# Patient Record
Sex: FEMALE | Race: WHITE | Employment: UNEMPLOYED | URBAN - METROPOLITAN AREA
[De-identification: names, ages, dates, MRNs, and addresses within clinical notes are randomized per-mention and may not be internally consistent; named-entity substitution may affect disease eponyms.]

---

## 2020-01-31 ENCOUNTER — HOSPITAL ENCOUNTER (EMERGENCY)
Age: 20
Discharge: HOME | End: 2020-01-31
Attending: EMERGENCY MEDICINE
Payer: COMMERCIAL

## 2020-01-31 VITALS
TEMPERATURE: 98.6 F | HEART RATE: 93 BPM | RESPIRATION RATE: 18 BRPM | SYSTOLIC BLOOD PRESSURE: 121 MMHG | DIASTOLIC BLOOD PRESSURE: 90 MMHG | OXYGEN SATURATION: 99 %

## 2020-01-31 LAB
RAPID INFLUENZA  B AGN: NEGATIVE
RAPID INFLUENZA A AGN: NEGATIVE
S PYO AG THROAT QL: NEGATIVE

## 2020-01-31 PROCEDURE — 87081 CULTURE SCREEN ONLY: CPT

## 2020-01-31 PROCEDURE — 99283 EMERGENCY DEPT VISIT LOW MDM: CPT

## 2020-01-31 PROCEDURE — 6360000002 HC RX W HCPCS: Performed by: EMERGENCY MEDICINE

## 2020-01-31 PROCEDURE — 6370000000 HC RX 637 (ALT 250 FOR IP): Performed by: EMERGENCY MEDICINE

## 2020-01-31 PROCEDURE — 87880 STREP A ASSAY W/OPTIC: CPT

## 2020-01-31 PROCEDURE — 87804 INFLUENZA ASSAY W/OPTIC: CPT

## 2020-01-31 RX ORDER — CEFDINIR 300 MG/1
300 CAPSULE ORAL 2 TIMES DAILY
COMMUNITY
End: 2021-09-21

## 2020-01-31 RX ORDER — DEXAMETHASONE SODIUM PHOSPHATE 10 MG/ML
10 INJECTION INTRAMUSCULAR; INTRAVENOUS ONCE
Status: COMPLETED | OUTPATIENT
Start: 2020-01-31 | End: 2020-01-31

## 2020-01-31 RX ORDER — BENZONATATE 100 MG/1
200 CAPSULE ORAL 3 TIMES DAILY PRN
Qty: 30 CAPSULE | Refills: 0 | Status: SHIPPED | OUTPATIENT
Start: 2020-01-31 | End: 2020-02-07

## 2020-01-31 RX ORDER — FLUTICASONE PROPIONATE 50 MCG
2 SPRAY, SUSPENSION (ML) NASAL DAILY
Qty: 1 BOTTLE | Refills: 0 | Status: SHIPPED | OUTPATIENT
Start: 2020-01-31 | End: 2021-09-21

## 2020-01-31 RX ORDER — LIDOCAINE HYDROCHLORIDE 20 MG/ML
15 SOLUTION OROPHARYNGEAL ONCE
Status: COMPLETED | OUTPATIENT
Start: 2020-01-31 | End: 2020-01-31

## 2020-01-31 RX ORDER — ONDANSETRON 4 MG/1
4 TABLET, ORALLY DISINTEGRATING ORAL EVERY 8 HOURS PRN
Qty: 12 TABLET | Refills: 0 | Status: SHIPPED | OUTPATIENT
Start: 2020-01-31 | End: 2021-09-21

## 2020-01-31 RX ORDER — IBUPROFEN 600 MG/1
600 TABLET ORAL ONCE
Status: COMPLETED | OUTPATIENT
Start: 2020-01-31 | End: 2020-01-31

## 2020-01-31 RX ORDER — IBUPROFEN 600 MG/1
600 TABLET ORAL EVERY 8 HOURS PRN
Qty: 21 TABLET | Refills: 0 | Status: SHIPPED | OUTPATIENT
Start: 2020-01-31 | End: 2021-09-24

## 2020-01-31 RX ADMIN — DEXAMETHASONE SODIUM PHOSPHATE 10 MG: 10 INJECTION, SOLUTION INTRAMUSCULAR; INTRAVENOUS at 01:51

## 2020-01-31 RX ADMIN — IBUPROFEN 600 MG: 600 TABLET, FILM COATED ORAL at 01:51

## 2020-01-31 RX ADMIN — LIDOCAINE HYDROCHLORIDE 15 ML: 20 SOLUTION ORAL; TOPICAL at 01:51

## 2020-01-31 ASSESSMENT — PAIN SCALES - GENERAL: PAINLEVEL_OUTOF10: 10

## 2020-01-31 NOTE — ED PROVIDER NOTES
CHIEF COMPLAINT  Fever      HISTORY OF PRESENT ILLNESS  Sarah Bradford is a 23 y.o. female presents to the ED with sore throat, hurts to swallow, but not difficulty swallowing, still taking in fluids, has had fever as well, has been taking  Tylenol but nothing recently, started about 3 days ago, saw primary care doctor and did not do a strep test but started cefdinir, no chest pain or shortness of breath, a little nausea earlier but this resolved, no vomiting, no abdominal pain, no diarrhea, no urinary symptoms, no current concern for pregnancy. Been trying Chloraseptic, and it was helping initially but not now, no vaginal discharge or concern for STDs, no headache or neck stiffness, but nasal congestion that makes her ears hurt, she is a smoker, no other complaints, modifying factors or associated symptoms. I have reviewed the following from the nursing documentation. History reviewed. No pertinent past medical history. History reviewed. No pertinent surgical history. History reviewed. No pertinent family history.   Social History     Socioeconomic History    Marital status: Single     Spouse name: Not on file    Number of children: Not on file    Years of education: Not on file    Highest education level: Not on file   Occupational History    Not on file   Social Needs    Financial resource strain: Not on file    Food insecurity:     Worry: Not on file     Inability: Not on file    Transportation needs:     Medical: Not on file     Non-medical: Not on file   Tobacco Use    Smoking status: Current Every Day Smoker     Packs/day: 0.50     Types: Cigarettes   Substance and Sexual Activity    Alcohol use: No    Drug use: No    Sexual activity: Not Currently   Lifestyle    Physical activity:     Days per week: Not on file     Minutes per session: Not on file    Stress: Not on file   Relationships    Social connections:     Talks on phone: Not on file     Gets together: Not on file     Attends meningitis/encephalitis/sepsis/pneumonia, she appeared nontoxic and well-hydrated, no current concern for airway compromise, strict return precautions given, all questions answered, will return if any worsening symptoms or new concerns, see AVS for further discharge information, patient verbalized understanding of plan, felt comfortable going home. Orders Placed This Encounter   Procedures    Rapid influenza A/B antigens    Strep screen group a throat    Culture Beta Strep Confirm Plate     Orders Placed This Encounter   Medications    lidocaine viscous hcl (XYLOCAINE) 2 % solution 15 mL    ibuprofen (ADVIL;MOTRIN) tablet 600 mg    dexamethasone (DECADRON) injection 10 mg    benzonatate (TESSALON PERLES) 100 MG capsule     Sig: Take 2 capsules by mouth 3 times daily as needed for Cough     Dispense:  30 capsule     Refill:  0    fluticasone (FLONASE) 50 MCG/ACT nasal spray     Si sprays by Each Nostril route daily for 7 days     Dispense:  1 Bottle     Refill:  0    ibuprofen (ADVIL;MOTRIN) 600 MG tablet     Sig: Take 1 tablet by mouth every 8 hours as needed for Pain or Fever     Dispense:  21 tablet     Refill:  0    ondansetron (ZOFRAN ODT) 4 MG disintegrating tablet     Sig: Take 1 tablet by mouth every 8 hours as needed for Nausea or Vomiting     Dispense:  12 tablet     Refill:  0    Magic Mouthwash (MIRACLE MOUTHWASH)     Sig: Swish and spit 10 mLs 4 times daily as needed for Irritation (throat pain) 1 part 2% viscous lidocaine, 1 part diphenhydramine, 1 part Maalox     Dispense:  240 mL     Refill:  [de-identified]     23year-old with gross  Patient was given scripts for the following medications. I counseled patient how to take these medications.    New Prescriptions    BENZONATATE (TESSALON PERLES) 100 MG CAPSULE    Take 2 capsules by mouth 3 times daily as needed for Cough    FLUTICASONE (FLONASE) 50 MCG/ACT NASAL SPRAY    2 sprays by Each Nostril route daily for 7 days    IBUPROFEN (ADVIL;MOTRIN)

## 2020-01-31 NOTE — ED TRIAGE NOTES
Pt reports sore throat 3 days ago. Saw PCP put on cefdinir for clinical strep with -rapid. Doesn't seem to be helping.

## 2020-02-01 LAB — S PYO THROAT QL CULT: NORMAL

## 2021-09-21 ENCOUNTER — HOSPITAL ENCOUNTER (EMERGENCY)
Age: 21
Discharge: HOME | End: 2021-09-21
Attending: STUDENT IN AN ORGANIZED HEALTH CARE EDUCATION/TRAINING PROGRAM
Payer: COMMERCIAL

## 2021-09-21 VITALS
SYSTOLIC BLOOD PRESSURE: 152 MMHG | WEIGHT: 130 LBS | HEIGHT: 63 IN | BODY MASS INDEX: 23.04 KG/M2 | TEMPERATURE: 98 F | OXYGEN SATURATION: 100 % | HEART RATE: 85 BPM | DIASTOLIC BLOOD PRESSURE: 89 MMHG | RESPIRATION RATE: 20 BRPM

## 2021-09-21 DIAGNOSIS — F12.90 CANNABINOID HYPEREMESIS SYNDROME: Primary | ICD-10-CM

## 2021-09-21 DIAGNOSIS — R11.2 CANNABINOID HYPEREMESIS SYNDROME: Primary | ICD-10-CM

## 2021-09-21 PROCEDURE — 96374 THER/PROPH/DIAG INJ IV PUSH: CPT

## 2021-09-21 PROCEDURE — 6360000002 HC RX W HCPCS: Performed by: STUDENT IN AN ORGANIZED HEALTH CARE EDUCATION/TRAINING PROGRAM

## 2021-09-21 PROCEDURE — 99283 EMERGENCY DEPT VISIT LOW MDM: CPT

## 2021-09-21 RX ORDER — 0.9 % SODIUM CHLORIDE 0.9 %
1000 INTRAVENOUS SOLUTION INTRAVENOUS ONCE
Status: DISCONTINUED | OUTPATIENT
Start: 2021-09-21 | End: 2021-09-21

## 2021-09-21 RX ORDER — ONDANSETRON 4 MG/1
4 TABLET, ORALLY DISINTEGRATING ORAL EVERY 12 HOURS PRN
Qty: 20 TABLET | Refills: 0 | Status: ON HOLD | OUTPATIENT
Start: 2021-09-21 | End: 2022-06-09

## 2021-09-21 RX ORDER — ONDANSETRON 2 MG/ML
4 INJECTION INTRAMUSCULAR; INTRAVENOUS EVERY 6 HOURS PRN
Status: DISCONTINUED | OUTPATIENT
Start: 2021-09-21 | End: 2021-09-21 | Stop reason: HOSPADM

## 2021-09-21 RX ORDER — DICYCLOMINE HYDROCHLORIDE 10 MG/ML
20 INJECTION INTRAMUSCULAR ONCE
Status: DISCONTINUED | OUTPATIENT
Start: 2021-09-21 | End: 2021-09-21

## 2021-09-21 RX ADMIN — ONDANSETRON HYDROCHLORIDE 4 MG: 2 INJECTION, SOLUTION INTRAMUSCULAR; INTRAVENOUS at 16:52

## 2021-09-21 ASSESSMENT — ENCOUNTER SYMPTOMS
RHINORRHEA: 0
SHORTNESS OF BREATH: 0
SORE THROAT: 0
PHOTOPHOBIA: 0
ABDOMINAL PAIN: 1
COUGH: 0
NAUSEA: 1
BACK PAIN: 0
VOMITING: 1

## 2021-09-21 ASSESSMENT — PAIN DESCRIPTION - PAIN TYPE: TYPE: ACUTE PAIN

## 2021-09-21 ASSESSMENT — PAIN DESCRIPTION - LOCATION: LOCATION: ABDOMEN

## 2021-09-21 ASSESSMENT — PAIN SCALES - GENERAL: PAINLEVEL_OUTOF10: 5

## 2021-09-21 NOTE — ED PROVIDER NOTES
[address] ED  EMERGENCY DEPARTMENT ENCOUNTER      Pt Name: Jessica Ennis  MRN: [id number]  [place] 2000  Date of evaluation: 9/21/2021  Provider: Ivania Young, [address] [place] [address]       Chief Complaint   Patient presents with    Abdominal Pain     Pt states nausea and vomiting \"off and on\" for one month. Mom states that she was diagnosed with IBS and acid reflux and has an appoitment with GI on Oct. 14.    Emesis         HISTORY OF PRESENT ILLNESS   (Location/Symptom, Timing/Onset, Context/Setting, Quality, Duration, Modifying Factors, Severity)  Note limiting factors. Jessica Ennis is a 21 y.o. female who presents to the emergency department complaining of patient presents with a roughly 1 month history of intermittent severe nausea vomiting, generalized abdominal pain. Reports prior history of being diagnosed with irritable bowel syndrome however has not yet been seen by GI. Arrives actively vomiting. Has been seen previously at outside emergency department had lab work and was later discharged home without specific cause of symptoms. Patient states that the symptoms began short while after beginning to use marijuana daily. Reports that her symptoms are improved with hot showers or baths. Nursing Notes were reviewed. PAST MEDICAL HISTORY     Past Medical History:   Diagnosis Date    Kidney stone          SURGICAL HISTORY     History reviewed. No pertinent surgical history. CURRENT MEDICATIONS       Previous Medications    IBUPROFEN (ADVIL;MOTRIN) 600 MG TABLET    Take 1 tablet by mouth every 8 hours as needed for Pain or Fever       ALLERGIES     Zoloft [sertraline]    FAMILY HISTORY     History reviewed. No pertinent family history.        SOCIAL HISTORY       Social History     Socioeconomic History    Marital status: Single     Spouse name: None    Number of children: None    Years of education: None    Highest education level: None   Occupational History    None   Tobacco Use    Smoking status: Current Every Day Smoker     Packs/day: 1.00     Types: Cigarettes   Substance and Sexual Activity    Alcohol use: No    Drug use: No    Sexual activity: Not Currently   Other Topics Concern    None   Social History Narrative    None     Social Determinants of Health     Financial Resource Strain:     Difficulty of Paying Living Expenses:    Food Insecurity:     Worried About Running Out of Food in the Last Year:     Ran Out of Food in the Last Year:    Transportation Needs:     Lack of Transportation (Medical):  Lack of Transportation (Non-Medical):    Physical Activity:     Days of Exercise per Week:     Minutes of Exercise per Session:    Stress:     Feeling of Stress :    Social Connections:     Frequency of Communication with Friends and Family:     Frequency of Social Gatherings with Friends and Family:     Attends [employer] Services:     Active Member of Clubs or Organizations:     Attends Club or Organization Meetings:     Marital Status:    Intimate Partner Violence:     Fear of Current or Ex-Partner:     Emotionally Abused:     Physically Abused:     Sexually Abused:        SCREENINGS                            REVIEW OF SYSTEMS    (2-9 systems for level 4, 10 or more for level 5)   Review of Systems   Constitutional: Negative for chills and fever. HENT: Negative for congestion, rhinorrhea and sore throat. Eyes: Negative for photophobia and visual disturbance. Respiratory: Negative for cough and shortness of breath. Cardiovascular: Negative for chest pain. Gastrointestinal: Positive for abdominal pain, nausea and vomiting. Genitourinary: Negative for decreased urine volume and dysuria. Musculoskeletal: Negative for back pain, neck pain and neck stiffness. Skin: Negative for rash. Allergic/Immunologic: Negative for environmental allergies. Hematological: Negative for adenopathy.    Psychiatric/Behavioral: Negative for confusion. PHYSICAL EXAM    (up to 7 for level 4, 8 or more for level 5)   RECENT VITALS:     Temp: 98 °F (36.7 °C),  Pulse: 85, Resp: 20, BP: (!) 152/89, SpO2: 100 %    Physical Exam  Constitutional:       General: She is not in acute distress. Appearance: She is not diaphoretic. HENT:      Head: Normocephalic and atraumatic. Eyes:      Pupils: Pupils are equal, round, and reactive to light. Neck:      Trachea: No tracheal deviation. Cardiovascular:      Rate and Rhythm: Normal rate and regular rhythm. Pulmonary:      Effort: Pulmonary effort is normal. No respiratory distress. Abdominal:      General: There is no distension. Palpations: Abdomen is soft. Tenderness: There is no abdominal tenderness. Musculoskeletal:         General: Normal range of motion. Cervical back: Normal range of motion and neck supple. Skin:     General: Skin is warm. Neurological:      Mental Status: She is oriented to person, place, and time. DIAGNOSTIC RESULTS     EKG: All EKG's are interpreted by the Emergency Department Physician who either signs or Co-signs this chart in the absence of a cardiologist.      RADIOLOGY:   Non-plain film images such as CT, Ultrasound and MRI are read by the radiologist. Plain radiographic images are visualized and preliminarily interpreted by the emergency physician. Interpretation per the Radiologist below, if available at the time of this note:    No orders to display         LABS:  Labs Reviewed - No data to display    All other labs were within normal range or not returned as of this dictation. EMERGENCY DEPARTMENT COURSE and DIFFERENTIAL DIAGNOSIS/MDM:   Kaylin Dietz is a 21 y.o. female who presents to the emergency department with the complaint of recurrent nausea vomiting. Symptoms began within the last 1+ months after she began smoking marijuana, symptoms improved with hot showers.  Patient pacing in room, intermittently vomiting during my evaluation. Initial vitals however are stable. Abdomen is soft and nontender. Concerned that patient symptoms related to cannabinoid hyperemesis syndrome however due to reported episodes of vomiting I do feel is warranted to obtain lab screening. Has upcoming appointment with GI. Will treat with fluids, antiemetics. I had just left the room when mother came out stating that patient no longer wanted to be evaluated she is wants to go home and get into a hot bath. I did attempt to explain my concern of electrolyte derangement to patient and further work-up including evaluating liver numbers lipase, kidneys and overall basic blood counts. Patient states she does not want to wait around she is wants to go home, she was agreeable to take a dose of IV Zofran before IV was pulled. Patient does have decision-making capacity and has ability to make decisions about her health health care and refused work-up. Patient was informed that she may return at any point for reevaluation emergency department. Did prescribe patient ODT Zofran for home encourage continued p.o. fluids returning if worsening or uncontrolled symptoms            CRITICAL CARE TIME   Total Critical Care time was 0 minutes, excluding separately reportable procedures. There was a high probability of clinically significant/life threatening deterioration in the patient's condition which required my urgent intervention. Clinical concern   Intervention     CONSULTS:  None    PROCEDURES:  Unless otherwise noted below, none     Procedures        FINAL IMPRESSION      1.  Cannabinoid hyperemesis syndrome          DISPOSITION/PLAN   DISPOSITION Decision To Discharge 09/21/2021 04:47:00 PM      PATIENT REFERRED TO:  [place] [facility]  [address] [facility]  [phone]    If symptoms worsen    NIKA Obrien CNP  [address] [address] [address]  [phone]    Schedule an appointment as soon as possible for a visit in 2 days        DISCHARGE MEDICATIONS:  New Prescriptions    ONDANSETRON (ZOFRAN ODT) 4 MG DISINTEGRATING TABLET    Take 1 tablet by mouth every 12 hours as needed for Nausea     Controlled Substances Monitoring:     No flowsheet data found.     (Please note that portions of this note were completed with a voice recognition program.  Efforts were made to edit the dictations but occasionally words are mis-transcribed.)    Pramod Sauceda DO (electronically signed)  Attending Emergency Physician            Pramod Sauceda DO  09/21/21 4001

## 2021-09-21 NOTE — ED NOTES
Pt stable for DC to home with family. Per Rashaad Paula, pt wanting to leave. Provider Dr. Justo Varma spoke with patient, who agreed to an administration of IV zofran here and then DC with medication. Pt and mother understanding of DC instructions. Pt exited unit independently without trouble.       Alex Fierro RN  09/21/21 9662

## 2021-09-21 NOTE — LETTER
[place] [facility] [place] [facility]  [address] [facility] [address]  Phone: [phone]               September 21, 2021    Patient: Ana Ortiz   YOB: 2000   Date of Visit: 9/21/2021       To Whom It May Concern:    Machelle Pitts was seen and treated in our emergency department on 9/21/2021.     Sincerely,               Signature:__________________________________

## 2021-09-24 ENCOUNTER — HOSPITAL ENCOUNTER (EMERGENCY)
Age: 21
Discharge: LEFT BEFORE DISCHARGE | End: 2021-09-24
Attending: EMERGENCY MEDICINE
Payer: COMMERCIAL

## 2021-09-24 VITALS
SYSTOLIC BLOOD PRESSURE: 131 MMHG | WEIGHT: 130 LBS | HEART RATE: 103 BPM | DIASTOLIC BLOOD PRESSURE: 98 MMHG | TEMPERATURE: 98.1 F | HEIGHT: 63 IN | BODY MASS INDEX: 23.04 KG/M2 | RESPIRATION RATE: 38 BRPM | OXYGEN SATURATION: 100 %

## 2021-09-24 DIAGNOSIS — R11.0 NAUSEA: Primary | ICD-10-CM

## 2021-09-24 DIAGNOSIS — R10.9 ABDOMINAL PAIN, UNSPECIFIED ABDOMINAL LOCATION: ICD-10-CM

## 2021-09-24 LAB
A/G RATIO: 1.7 (ref 1.1–2.2)
ALBUMIN SERPL-MCNC: 5.1 G/DL (ref 3.4–5)
ALP BLD-CCNC: 101 U/L (ref 40–129)
ALT SERPL-CCNC: 13 U/L (ref 10–40)
ANION GAP SERPL CALCULATED.3IONS-SCNC: 18 MMOL/L (ref 3–16)
AST SERPL-CCNC: 14 U/L (ref 15–37)
BASOPHILS ABSOLUTE: 0 K/UL (ref 0–0.2)
BASOPHILS RELATIVE PERCENT: 0.4 %
BILIRUB SERPL-MCNC: 0.7 MG/DL (ref 0–1)
BUN BLDV-MCNC: 15 MG/DL (ref 7–20)
CALCIUM SERPL-MCNC: 10.3 MG/DL (ref 8.3–10.6)
CHLORIDE BLD-SCNC: 97 MMOL/L (ref 99–110)
CO2: 21 MMOL/L (ref 21–32)
CREAT SERPL-MCNC: 0.8 MG/DL (ref 0.6–1.1)
EOSINOPHILS ABSOLUTE: 0 K/UL (ref 0–0.6)
EOSINOPHILS RELATIVE PERCENT: 0.1 %
GFR AFRICAN AMERICAN: >60
GFR NON-AFRICAN AMERICAN: >60
GLOBULIN: 3 G/DL
GLUCOSE BLD-MCNC: 91 MG/DL (ref 70–99)
HCG QUALITATIVE: NEGATIVE
HCT VFR BLD CALC: 44.5 % (ref 36–48)
HEMOGLOBIN: 14.8 G/DL (ref 12–16)
LIPASE: 26 U/L (ref 13–60)
LYMPHOCYTES ABSOLUTE: 2.5 K/UL (ref 1–5.1)
LYMPHOCYTES RELATIVE PERCENT: 23.2 %
MAGNESIUM: 2.2 MG/DL (ref 1.8–2.4)
MCH RBC QN AUTO: 29.2 PG (ref 26–34)
MCHC RBC AUTO-ENTMCNC: 33.4 G/DL (ref 31–36)
MCV RBC AUTO: 87.6 FL (ref 80–100)
MONOCYTES ABSOLUTE: 0.9 K/UL (ref 0–1.3)
MONOCYTES RELATIVE PERCENT: 8.7 %
NEUTROPHILS ABSOLUTE: 7.3 K/UL (ref 1.7–7.7)
NEUTROPHILS RELATIVE PERCENT: 67.6 %
PDW BLD-RTO: 16.2 % (ref 12.4–15.4)
PLATELET # BLD: 300 K/UL (ref 135–450)
PMV BLD AUTO: 8 FL (ref 5–10.5)
POTASSIUM REFLEX MAGNESIUM: 3.3 MMOL/L (ref 3.5–5.1)
RBC # BLD: 5.07 M/UL (ref 4–5.2)
SODIUM BLD-SCNC: 136 MMOL/L (ref 136–145)
TOTAL PROTEIN: 8.1 G/DL (ref 6.4–8.2)
WBC # BLD: 10.8 K/UL (ref 4–11)

## 2021-09-24 PROCEDURE — 36415 COLL VENOUS BLD VENIPUNCTURE: CPT

## 2021-09-24 PROCEDURE — 83690 ASSAY OF LIPASE: CPT

## 2021-09-24 PROCEDURE — 6360000002 HC RX W HCPCS: Performed by: EMERGENCY MEDICINE

## 2021-09-24 PROCEDURE — 85025 COMPLETE CBC W/AUTO DIFF WBC: CPT

## 2021-09-24 PROCEDURE — 93005 ELECTROCARDIOGRAM TRACING: CPT | Performed by: EMERGENCY MEDICINE

## 2021-09-24 PROCEDURE — 84703 CHORIONIC GONADOTROPIN ASSAY: CPT

## 2021-09-24 PROCEDURE — 96374 THER/PROPH/DIAG INJ IV PUSH: CPT

## 2021-09-24 PROCEDURE — 80053 COMPREHEN METABOLIC PANEL: CPT

## 2021-09-24 PROCEDURE — 83735 ASSAY OF MAGNESIUM: CPT

## 2021-09-24 PROCEDURE — 2580000003 HC RX 258: Performed by: EMERGENCY MEDICINE

## 2021-09-24 PROCEDURE — 99284 EMERGENCY DEPT VISIT MOD MDM: CPT

## 2021-09-24 RX ORDER — DROPERIDOL 2.5 MG/ML
1.25 INJECTION, SOLUTION INTRAMUSCULAR; INTRAVENOUS ONCE
Status: COMPLETED | OUTPATIENT
Start: 2021-09-24 | End: 2021-09-24

## 2021-09-24 RX ORDER — SODIUM CHLORIDE, SODIUM LACTATE, POTASSIUM CHLORIDE, CALCIUM CHLORIDE 600; 310; 30; 20 MG/100ML; MG/100ML; MG/100ML; MG/100ML
1000 INJECTION, SOLUTION INTRAVENOUS ONCE
Status: COMPLETED | OUTPATIENT
Start: 2021-09-24 | End: 2021-09-24

## 2021-09-24 RX ADMIN — SODIUM CHLORIDE, POTASSIUM CHLORIDE, SODIUM LACTATE AND CALCIUM CHLORIDE 1000 ML: 600; 310; 30; 20 INJECTION, SOLUTION INTRAVENOUS at 17:19

## 2021-09-24 RX ADMIN — DROPERIDOL 1.25 MG: 2.5 INJECTION, SOLUTION INTRAMUSCULAR; INTRAVENOUS at 17:19

## 2021-09-24 NOTE — ED NOTES
Pts mother into bedside, noted to be yelling at pt stating \"You cannot leave. I'm not allowing you to leave. You need help. \" Pt noted to respond and say \"I'm fucking leaving and you are not going to stop me. \" Pt noted to get out of the tx bed and attempt to exit the room. Pts mother noted to  front of pt and block pt from leaving the tx room. Pts mother refusing to allow pt to exit the room after multiple requests made by several ER staff.  MIKAELA phoned to respond to Griffin Perkins RN  09/24/21 5840

## 2021-09-24 NOTE — ED NOTES
Pt refusing to allow repeat vital signs to be taken or to sign AMA paperwork.  MD made aware     Fe Contreras RN  09/24/21 1900

## 2021-09-24 NOTE — ED NOTES
Pt to ER via POV. Pt assisted into WC x 2 ER staff without incident. Pt noted with intermittent voluntary jerking movements. Pt able to stop these movements and speak with ER staff upon staff's request. Pt states \"My body is jerking all the time and I don't know why. \" Pt admits to recent marijuana use. Denies further drug or alcohol use. Pt assisted into tx bed with cardiac monitor applied, resps even and unlab. MD @ bedside.  Seizure precautions taken     Angela Claros RN  09/24/21 4859

## 2021-09-24 NOTE — ED NOTES
Pt noted to be yelling out. Upon this nurse's arrival to tx rm. Pt noted to be sitting upright in bed and actively pulling of her cardiac monitor wires and BP cuff. Pt states \"I want the fuck out of here. I am fine and I want this fucking IV out. \" Pt A & O x 3. MD made aware.  IV d/c'd per pt request. MD now into bedside to speak with pt     Laura Duffy RN  09/24/21 0206

## 2021-09-24 NOTE — ED NOTES
This nurse received a phone call from pts mother who stated \"I am bringing my daughter to your ER and I want someone waiting there to transfer her to . She is having violent seizures and has been seen at [facility] four times in the past week for the same thing and they refuse to do anything for her. \" This nurse stated to pts mother that she was more than welcome to bring her daughter in to be evaluated by the doctor but we could not have a squad here waiting to transfer the pt without first evaluating her. Mother states \"Well I'm bringing her in there so you need to be ready. \" MD made aware     Fela Valdez, RN  09/24/21 9565

## 2021-09-24 NOTE — ED PROVIDER NOTES
CHIEF COMPLAINT  Abdominal pain    HISTORY OF PRESENT ILLNESS  Sarah Lowe is a 21 y.o. female with a history of kidney stones, cannabinoid hyperemesis syndrome who presents emerge department for evaluation of abdominal pain. Patient was reportedly seen at an outside emergency department earlier today for evaluation of the same. Her mother states that she was being evaluated for seizures. She has no known seizure disorder. Patient was evaluated in our emergency department several days prior for evaluation of cannabinoid hyperemesis syndrome. Patient states the last time that she smoked marijuana was 2 to 3 days ago. She currently complains of nausea, upper abdominal pain. Throughout her initial triage, patient exhibited intermittent involuntary jerking movements. Patient is able to stop these movements and speak to ER staff when asked to. No other complaints, modifying factors or associated symptoms. I have reviewed the following from the nursing documentation. Past Medical History:   Diagnosis Date    Kidney stone      History reviewed. No pertinent surgical history. History reviewed. No pertinent family history.   Social History     Socioeconomic History    Marital status: Single     Spouse name: Not on file    Number of children: Not on file    Years of education: Not on file    Highest education level: Not on file   Occupational History    Not on file   Tobacco Use    Smoking status: Current Every Day Smoker     Packs/day: 0.50     Types: Cigarettes    Smokeless tobacco: Never Used   Substance and Sexual Activity    Alcohol use: No    Drug use: Yes     Types: Marijuana    Sexual activity: Not Currently   Other Topics Concern    Not on file   Social History Narrative    Not on file     Social Determinants of Health     Financial Resource Strain:     Difficulty of Paying Living Expenses:    Food Insecurity:     Worried About Running Out of Food in the Last Year:     [employer] in the Last Year:    Transportation Needs:     Lack of Transportation (Medical):  Lack of Transportation (Non-Medical):    Physical Activity:     Days of Exercise per Week:     Minutes of Exercise per Session:    Stress:     Feeling of Stress :    Social Connections:     Frequency of Communication with Friends and Family:     Frequency of Social Gatherings with Friends and Family:     Attends Nondenominational Services:     Active Member of Clubs or Organizations:     Attends Club or Organization Meetings:     Marital Status:    Intimate Partner Violence:     Fear of Current or Ex-Partner:     Emotionally Abused:     Physically Abused:     Sexually Abused:      No current facility-administered medications for this encounter. Current Outpatient Medications   Medication Sig Dispense Refill    ondansetron (ZOFRAN ODT) 4 MG disintegrating tablet Take 1 tablet by mouth every 12 hours as needed for Nausea 20 tablet 0     Allergies   Allergen Reactions    Zoloft [Sertraline]        REVIEW OF SYSTEMS  10 systems reviewed, pertinent positives per HPI otherwise noted to be negative. PHYSICAL EXAM  BP (!) 131/98   Pulse 103   Temp 98.1 °F (36.7 °C) (Oral)   Resp (!) 38 Comment: hyperventilating  Ht 5' 3\" (1.6 m)   Wt 130 lb (59 kg)   LMP 09/19/2021   SpO2 100%   BMI 23.03 kg/m²   GENERAL APPEARANCE: Awake and alert. Cooperative. Moderately anxious, tachypneic. Patient intermittently demonstrates involuntary jerking movements which stopped when she is exposed to ammonia or when asked to stop. HEAD: Normocephalic. Atraumatic. EYES: PERRL. EOM's grossly intact. ENT: Mucous membranes are moist.   NECK: Supple, trachea midline. No significant lymphadenopathy  HEART: RRR. No harsh murmurs. Intact radial pulses 2+ bilaterally. LUNGS: Respirations unlabored without accessory muscle use. Speaking comfortably in full sentences. ABDOMEN: Soft. Non-distended. Non-tender.  No guarding or rebound. EXTREMITIES: No peripheral edema. No acute deformities. SKIN: Warm and dry. No acute rashes. NEUROLOGICAL: Alert and oriented X 3. CN II-XII intact. No gross facial drooping. Strength 5/5, sensation intact. Patient not cooperative with full neurological exam. Moving all 4 extremities. Stable gait. PSYCHIATRIC: Normal mood and affect. LABS  I have reviewed all labs for this visit.    Results for orders placed or performed during the hospital encounter of 09/24/21   CBC Auto Differential   Result Value Ref Range    WBC 10.8 4.0 - 11.0 K/uL    RBC 5.07 4.00 - 5.20 M/uL    Hemoglobin 14.8 12.0 - 16.0 g/dL    Hematocrit 44.5 36.0 - 48.0 %    MCV 87.6 80.0 - 100.0 fL    MCH 29.2 26.0 - 34.0 pg    MCHC 33.4 31.0 - 36.0 g/dL    RDW 16.2 (H) 12.4 - 15.4 %    Platelets 110 212 - 005 K/uL    MPV 8.0 5.0 - 10.5 fL    Neutrophils % 67.6 %    Lymphocytes % 23.2 %    Monocytes % 8.7 %    Eosinophils % 0.1 %    Basophils % 0.4 %    Neutrophils Absolute 7.3 1.7 - 7.7 K/uL    Lymphocytes Absolute 2.5 1.0 - 5.1 K/uL    Monocytes Absolute 0.9 0.0 - 1.3 K/uL    Eosinophils Absolute 0.0 0.0 - 0.6 K/uL    Basophils Absolute 0.0 0.0 - 0.2 K/uL   Comprehensive Metabolic Panel w/ Reflex to MG   Result Value Ref Range    Sodium 136 136 - 145 mmol/L    Potassium reflex Magnesium 3.3 (L) 3.5 - 5.1 mmol/L    Chloride 97 (L) 99 - 110 mmol/L    CO2 21 21 - 32 mmol/L    Anion Gap 18 (H) 3 - 16    Glucose 91 70 - 99 mg/dL    BUN 15 7 - 20 mg/dL    CREATININE 0.8 0.6 - 1.1 mg/dL    GFR Non-African American >60 >60    GFR African American >60 >60    Calcium 10.3 8.3 - 10.6 mg/dL    Total Protein 8.1 6.4 - 8.2 g/dL    Albumin 5.1 (H) 3.4 - 5.0 g/dL    Albumin/Globulin Ratio 1.7 1.1 - 2.2    Total Bilirubin 0.7 0.0 - 1.0 mg/dL    Alkaline Phosphatase 101 40 - 129 U/L    ALT 13 10 - 40 U/L    AST 14 (L) 15 - 37 U/L    Globulin 3.0 g/dL   HCG Qualitative, Serum   Result Value Ref Range    hCG Qual Negative Detects HCG level >10 MIU/mL Lipase   Result Value Ref Range    Lipase 26.0 13.0 - 60.0 U/L   Magnesium   Result Value Ref Range    Magnesium 2.20 1.80 - 2.40 mg/dL   EKG 12 Lead   Result Value Ref Range    Ventricular Rate 102 BPM    Atrial Rate 102 BPM    P-R Interval 112 ms    QRS Duration 80 ms    Q-T Interval 356 ms    QTc Calculation (Bazett) 463 ms    P Axis 64 degrees    R Axis 78 degrees    T Axis 21 degrees    Diagnosis       Sinus tachycardiaOtherwise normal ECGWhen compared with ECG of 24-SEP-2021 10:55, (unconfirmed)Premature supraventricular complexes are no longer PresentNonspecific T wave abnormality, improved in Inferior leads       EKG  The Ekg interpreted by myself in the emergency department in the absence of a cardiologist.  sinus tachycardia, rvft=000 with a rate of 102  Axis is   Normal  QTc is  within an acceptable range  Intervals and Durations are unremarkable. No specific ST-T wave changes appreciated. No evidence of acute ischemia. No prior EKG available for comparison      RADIOLOGY  X-RAYS:  I have reviewed radiologic plain film image(s). ALL OTHER NON-PLAIN FILM IMAGES SUCH AS CT, ULTRASOUND AND MRI HAVE BEEN READ BY THE RADIOLOGIST. No orders to display          ED COURSE/MDM  Patient seen and evaluated. Old records reviewed. Labs and imaging reviewed and results discussed with patient. This is a 80-year-old female with past history above who presents emergency department for evaluation of abdominal pain, nausea. Her mother had called into the emergency department and was concerned for seizure-like activity. Patient has been demonstrating voluntary jerking movements since she arrived to the emergency department. This appears to be volitional. She does not have any loss of consciousness associated with these episodes and she is able to stop the jerking movements when asked by nursing staff. Patient is largely uncooperative with nursing staff.  We were able to obtain initial laboratory draw however shortly after blood was drawn, patient stated that she wanted to leave the emergency department and go to a different hospital. Patient is not demonstrating any specific signs of toxidrome and appears competent to be able to make this decision as she came here of her own volition. Patient was offered multiple times for repeat assessment and to stay in the emergency department for continuation of her work-up however patient wanted to leave [address] Patient was advised that she can return to emergency department at any time if she wishes to proceed with her work-up and management. Patient refused to sign AMA paperwork. Patient was given scripts for the following medications. I counseled patient how to take these medications. Discharge Medication List as of 9/24/2021  6:15 PM          CLINICAL IMPRESSION  1. Nausea    2. Abdominal pain, unspecified abdominal location        Blood pressure (!) 131/98, pulse 103, temperature 98.1 °F (36.7 °C), temperature source Oral, resp. rate (!) 38, height 5' 3\" (1.6 m), weight 130 lb (59 kg), last menstrual period 09/19/2021, SpO2 100 %, not currently breastfeeding.     DISPOSITION  Sarah Irvin left to the emergency [address] Yudelka Peters MD  09/24/21 0581

## 2021-09-24 NOTE — ED NOTES
[facility] Officers Tapan Lamar and Kaleb Pina into ER and into tx rm to speak with pt & pts mother     Jesusita Manley RN  09/24/21 4632

## 2021-09-25 ENCOUNTER — HOSPITAL ENCOUNTER (EMERGENCY)
Age: 21
Discharge: LEFT BEFORE DISCHARGE | End: 2021-09-25
Attending: EMERGENCY MEDICINE
Payer: COMMERCIAL

## 2021-09-25 VITALS
OXYGEN SATURATION: 100 % | BODY MASS INDEX: 25.76 KG/M2 | HEART RATE: 86 BPM | SYSTOLIC BLOOD PRESSURE: 132 MMHG | RESPIRATION RATE: 28 BRPM | HEIGHT: 62 IN | TEMPERATURE: 98.2 F | WEIGHT: 140 LBS | DIASTOLIC BLOOD PRESSURE: 97 MMHG

## 2021-09-25 DIAGNOSIS — F99 PSYCHIATRIC DISORDER: ICD-10-CM

## 2021-09-25 DIAGNOSIS — R11.2 NAUSEA AND VOMITING, INTRACTABILITY OF VOMITING NOT SPECIFIED, UNSPECIFIED VOMITING TYPE: ICD-10-CM

## 2021-09-25 DIAGNOSIS — F44.5 PSEUDOSEIZURE: Primary | ICD-10-CM

## 2021-09-25 DIAGNOSIS — R10.9 ABDOMINAL PAIN, UNSPECIFIED ABDOMINAL LOCATION: ICD-10-CM

## 2021-09-25 LAB
A/G RATIO: 1.8 (ref 1.1–2.2)
ALBUMIN SERPL-MCNC: 4.9 G/DL (ref 3.4–5)
ALP BLD-CCNC: 89 U/L (ref 40–129)
ALT SERPL-CCNC: 10 U/L (ref 10–40)
ANION GAP SERPL CALCULATED.3IONS-SCNC: 20 MMOL/L (ref 3–16)
AST SERPL-CCNC: 13 U/L (ref 15–37)
BASOPHILS ABSOLUTE: 0.1 K/UL (ref 0–0.2)
BASOPHILS RELATIVE PERCENT: 0.4 %
BILIRUB SERPL-MCNC: 0.7 MG/DL (ref 0–1)
BUN BLDV-MCNC: 17 MG/DL (ref 7–20)
CALCIUM SERPL-MCNC: 9.8 MG/DL (ref 8.3–10.6)
CHLORIDE BLD-SCNC: 99 MMOL/L (ref 99–110)
CO2: 15 MMOL/L (ref 21–32)
CREAT SERPL-MCNC: 0.8 MG/DL (ref 0.6–1.1)
EKG ATRIAL RATE: 102 BPM
EKG DIAGNOSIS: NORMAL
EKG P AXIS: 64 DEGREES
EKG P-R INTERVAL: 112 MS
EKG Q-T INTERVAL: 356 MS
EKG QRS DURATION: 80 MS
EKG QTC CALCULATION (BAZETT): 463 MS
EKG R AXIS: 78 DEGREES
EKG T AXIS: 21 DEGREES
EKG VENTRICULAR RATE: 102 BPM
EOSINOPHILS ABSOLUTE: 0 K/UL (ref 0–0.6)
EOSINOPHILS RELATIVE PERCENT: 0.1 %
GFR AFRICAN AMERICAN: >60
GFR NON-AFRICAN AMERICAN: >60
GLOBULIN: 2.7 G/DL
GLUCOSE BLD-MCNC: 99 MG/DL (ref 70–99)
HCT VFR BLD CALC: 40.2 % (ref 36–48)
HEMOGLOBIN: 13.6 G/DL (ref 12–16)
LYMPHOCYTES ABSOLUTE: 2.6 K/UL (ref 1–5.1)
LYMPHOCYTES RELATIVE PERCENT: 20.9 %
MCH RBC QN AUTO: 29.2 PG (ref 26–34)
MCHC RBC AUTO-ENTMCNC: 33.9 G/DL (ref 31–36)
MCV RBC AUTO: 86.1 FL (ref 80–100)
MONOCYTES ABSOLUTE: 1.4 K/UL (ref 0–1.3)
MONOCYTES RELATIVE PERCENT: 11 %
NEUTROPHILS ABSOLUTE: 8.5 K/UL (ref 1.7–7.7)
NEUTROPHILS RELATIVE PERCENT: 67.6 %
PDW BLD-RTO: 15.8 % (ref 12.4–15.4)
PLATELET # BLD: 316 K/UL (ref 135–450)
PMV BLD AUTO: 8.3 FL (ref 5–10.5)
POTASSIUM SERPL-SCNC: 3.3 MMOL/L (ref 3.5–5.1)
RBC # BLD: 4.67 M/UL (ref 4–5.2)
SODIUM BLD-SCNC: 134 MMOL/L (ref 136–145)
TOTAL PROTEIN: 7.6 G/DL (ref 6.4–8.2)
WBC # BLD: 12.5 K/UL (ref 4–11)

## 2021-09-25 PROCEDURE — 99282 EMERGENCY DEPT VISIT SF MDM: CPT

## 2021-09-25 PROCEDURE — 80053 COMPREHEN METABOLIC PANEL: CPT

## 2021-09-25 PROCEDURE — 96375 TX/PRO/DX INJ NEW DRUG ADDON: CPT

## 2021-09-25 PROCEDURE — 6360000002 HC RX W HCPCS: Performed by: EMERGENCY MEDICINE

## 2021-09-25 PROCEDURE — 2500000003 HC RX 250 WO HCPCS: Performed by: EMERGENCY MEDICINE

## 2021-09-25 PROCEDURE — 93010 ELECTROCARDIOGRAM REPORT: CPT | Performed by: INTERNAL MEDICINE

## 2021-09-25 PROCEDURE — 2580000003 HC RX 258: Performed by: EMERGENCY MEDICINE

## 2021-09-25 PROCEDURE — 85025 COMPLETE CBC W/AUTO DIFF WBC: CPT

## 2021-09-25 PROCEDURE — 96374 THER/PROPH/DIAG INJ IV PUSH: CPT

## 2021-09-25 RX ORDER — SUCRALFATE 1 G/1
1 TABLET ORAL 4 TIMES DAILY
Qty: 40 TABLET | Refills: 0 | Status: ON HOLD | OUTPATIENT
Start: 2021-09-25 | End: 2021-12-23 | Stop reason: ALTCHOICE

## 2021-09-25 RX ORDER — 0.9 % SODIUM CHLORIDE 0.9 %
1000 INTRAVENOUS SOLUTION INTRAVENOUS ONCE
Status: COMPLETED | OUTPATIENT
Start: 2021-09-25 | End: 2021-09-25

## 2021-09-25 RX ORDER — DROPERIDOL 2.5 MG/ML
1.25 INJECTION, SOLUTION INTRAMUSCULAR; INTRAVENOUS ONCE
Status: COMPLETED | OUTPATIENT
Start: 2021-09-25 | End: 2021-09-25

## 2021-09-25 RX ORDER — LORAZEPAM 1 MG/1
1 TABLET ORAL EVERY 6 HOURS PRN
Status: ON HOLD | COMMUNITY
End: 2022-06-09

## 2021-09-25 RX ORDER — OMEPRAZOLE 20 MG/1
20 CAPSULE, DELAYED RELEASE ORAL DAILY
Qty: 14 CAPSULE | Refills: 0 | Status: SHIPPED | OUTPATIENT
Start: 2021-09-25 | End: 2021-11-10

## 2021-09-25 RX ADMIN — FAMOTIDINE 20 MG: 10 INJECTION, SOLUTION INTRAVENOUS at 21:43

## 2021-09-25 RX ADMIN — SODIUM CHLORIDE 1000 ML: 9 INJECTION, SOLUTION INTRAVENOUS at 21:18

## 2021-09-25 RX ADMIN — DROPERIDOL 1.25 MG: 2.5 INJECTION, SOLUTION INTRAMUSCULAR; INTRAVENOUS at 21:43

## 2021-09-25 ASSESSMENT — PAIN SCALES - GENERAL: PAINLEVEL_OUTOF10: 6

## 2021-09-25 ASSESSMENT — PAIN DESCRIPTION - LOCATION: LOCATION: ABDOMEN

## 2021-09-26 NOTE — ED PROVIDER NOTES
Social Determinants of Health     Financial Resource Strain:     Difficulty of Paying Living Expenses:    Food Insecurity:     Worried About Running Out of Food in the Last Year:     [address] in the Last Year:    Transportation Needs:     Lack of Transportation (Medical):  Lack of Transportation (Non-Medical):    Physical Activity:     Days of Exercise per Week:     Minutes of Exercise per Session:    Stress:     Feeling of Stress :    Social Connections:     Frequency of Communication with Friends and Family:     Frequency of Social Gatherings with Friends and Family:     Attends [employer] Services:     Active Member of Clubs or Organizations:     Attends Club or Organization Meetings:     Marital Status:    Intimate Partner Violence:     Fear of Current or Ex-Partner:     Emotionally Abused:     Physically Abused:     Sexually Abused:      No current facility-administered medications for this encounter. Current Outpatient Medications   Medication Sig Dispense Refill    LORazepam (ATIVAN) 1 MG tablet Take 1 mg by mouth every 6 hours as needed for Anxiety.  sucralfate (CARAFATE) 1 GM tablet Take 1 tablet by mouth 4 times daily for 10 days 40 tablet 0    omeprazole (PRILOSEC) 20 MG delayed release capsule Take 1 capsule by mouth daily for 14 days 14 capsule 0    ondansetron (ZOFRAN ODT) 4 MG disintegrating tablet Take 1 tablet by mouth every 12 hours as needed for Nausea 20 tablet 0     Allergies   Allergen Reactions    Zoloft [Sertraline]        REVIEW OF SYSTEMS  10 systems reviewed, pertinent positives per HPI otherwise noted to be negative. PHYSICAL EXAM  BP (!) 132/97   Pulse 86   Temp 98.2 °F (36.8 °C) (Oral)   Resp 28   Ht 5' 2\" (1.575 m)   Wt 140 lb (63.5 kg)   LMP 09/19/2021   SpO2 100%   BMI 25.61 kg/m²   GENERAL APPEARANCE: Awake and alert. Patient minimally cooperative, anxious, tearful. Appears nontoxic. HEAD: Normocephalic. Atraumatic.   EYES: >60    Calcium 9.8 8.3 - 10.6 mg/dL    Total Protein 7.6 6.4 - 8.2 g/dL    Albumin 4.9 3.4 - 5.0 g/dL    Albumin/Globulin Ratio 1.8 1.1 - 2.2    Total Bilirubin 0.7 0.0 - 1.0 mg/dL    Alkaline Phosphatase 89 40 - 129 U/L    ALT 10 10 - 40 U/L    AST 13 (L) 15 - 37 U/L    Globulin 2.7 g/dL       EKG  Sinus rhythm, rate 80, normal axis, QTC within normal limits, no acute ST or T wave abnormalities. RADIOLOGY  X-RAYS:  I have reviewed radiologic plain film image(s). ALL OTHER NON-PLAIN FILM IMAGES SUCH AS CT, ULTRASOUND AND MRI HAVE BEEN READ BY THE RADIOLOGIST. No orders to display            ED COURSE/MDM  Patient seen and evaluated. Old records reviewed. Labs and imaging reviewed and results discussed with patient. Patient is extremely emotional, tearful, anxious, impatient. Reports recent extensive work-up including CT scans which were unremarkable. She may have some component of gastritis. We will start her on Carafate and Prilosec. She has been referred to gastroenterology and neurology although I strongly suspect she is having pseudoseizures. Patient was adamant about leaving AMA prior to completion of her work-up. .     Patient was given scripts for the following medications. I counseled patient how to take these medications. Discharge Medication List as of 9/25/2021 10:18 PM      START taking these medications    Details   sucralfate (CARAFATE) 1 GM tablet Take 1 tablet by mouth 4 times daily for 10 days, Disp-40 tablet, R-0Normal      omeprazole (PRILOSEC) 20 MG delayed release capsule Take 1 capsule by mouth daily for 14 days, Disp-14 capsule, R-0Normal             CLINICAL IMPRESSION  1. Pseudoseizure    2. Abdominal pain, unspecified abdominal location    3. Nausea and vomiting, intractability of vomiting not specified, unspecified vomiting type    4. Psychiatric disorder        Blood pressure (!) 132/97, pulse 86, temperature 98.2 °F (36.8 °C), temperature source Oral, resp.  rate 28,

## 2021-09-26 NOTE — ED NOTES
Pt began to shake and she eyes rolled back. A sternal rib was completed and Pt stopped shaking and she opened her eyes. Pt is able to answer questions after episode of shaking. Pt's arm was also raised above her head while shaking and Pt hold she arm up in the air unassisted.       Chari Clay RN  09/25/21 1399

## 2021-09-26 NOTE — ED NOTES
Patient signed Kate Common form. Pt has been on and off upset, has told the people with her that she did not want to come in the first place, the pt was so upset at times that she screamed and said she can leave if she wanted to. The doctor came in and calmed her down.  The doctor is going to finish up discharge papers and let her leave [address] RN  09/25/21 5412

## 2021-11-09 NOTE — PROGRESS NOTES
[address]    [address],  [address] [facility]  [place]  Phone: [phone]   YIN:[phone]    CHIEF COMPLAINT     Chief Complaint   Patient presents with    New Patient     diarrhea and acid reflux, vomit bile        HPI     Thank you Sybil Guy APRN - CNP for asking me to see Christiano Seymour in consultation. Christiano Seymour is a [address] y.o. female Single [1] White (non-) [1] with medical history of anxiety, depression, cannabis use seen independently with referral for diarrhea and gastric reflux. Patient presents with her boyfriend's mother. She reports epigastric pain of about 6 months duration. Pain is burning in nature, intermittent, radiating to the back, associated with recurrent, cyclical nausea, bilious vomiting, loose stools and seizure-like episodes. Hot showers and hot tubs relieve symptoms. Denies fever, chills, unintentional weight loss, constipation, hematochezia or melenic stools. Reports chronic cannabinoid use over the past 4 years and episodes of binge alcohol drinking 6 months ago. She was seen in [facility] ED on 9/25/2021 with multiple complaints including seizures, epigastric abdominal pain, nausea and vomiting. Patient had been seen at [facility] ED on 9/24/2021 for suspected seizures. Evaluated by neurology and deemed not consistent with epilepsy. Patient recommendation for outpatient further work-up. Labs including CBC, BMP were unremarkable except low potassium 3. CT head was negative for acute intracranial pathology. Urine toxicology on 9/24/2021 noted positive for cannabis and benzodiazepine. During today's encounter, patient's boyfriend's mother became confrontational and disagreable regarding discussion of likely diagnosis of cannabinoid hyperemesis syndrome. She had to be excused from the room given her interruptions with the patient encounter. Last Encounter Reviewed: None  Pertinent PMH, FH, SH is reviewed below.   Last EGD: None  Last Colonoscopy: None    Review of available records reveals: Wt Readings from Last 50 Encounters:   11/10/21 124 lb 6.4 oz (56.4 kg)   09/25/21 140 lb (63.5 kg)   09/24/21 130 lb (59 kg)   09/21/21 130 lb (59 kg)   09/26/17 123 lb (55.8 kg) (54 %, Z= 0.09)*   07/02/17 120 lb (54.4 kg) (49 %, Z= -0.03)*     * Growth percentiles are based on CDC (Girls, 2-20 Years) data. No components found for: HGBA1C  BP Readings from Last 3 Encounters:   11/10/21 132/78   09/25/21 (!) 132/97   09/24/21 (!) 131/98     Health Maintenance   Topic Date Due    Hepatitis C screen  Never done    Varicella vaccine (1 of 2 - 2-dose childhood series) Never done    Pneumococcal 0-64 years Vaccine (1 of 2 - PPSV23) Never done    HPV vaccine (1 - 2-dose series) Never done    COVID-19 Vaccine (1) Never done    HIV screen  Never done    Chlamydia screen  Never done    DTaP/Tdap/Td vaccine (1 - Tdap) Never done    Flu vaccine (1) Never done    Hepatitis A vaccine  Aged Out    Hepatitis B vaccine  Aged Out    Hib vaccine  Aged Out    Meningococcal (ACWY) vaccine  Aged Out       No components found for: 7fgame     PAST MEDICAL HISTORY     Past Medical History:   Diagnosis Date    Kidney stone      FAMILY HISTORY   No family history on file.   SOCIAL HISTORY     Social History     Socioeconomic History    Marital status: Single     Spouse name: Not on file    Number of children: Not on file    Years of education: Not on file    Highest education level: Not on file   Occupational History    Not on file   Tobacco Use    Smoking status: Current Every Day Smoker     Packs/day: 0.50     Types: Cigarettes    Smokeless tobacco: Never Used   Substance and Sexual Activity    Alcohol use: No    Drug use: Yes     Types: Marijuana Wnyn Anna)    Sexual activity: Not Currently   Other Topics Concern    Not on file   Social History Narrative    Not on file     Social Determinants of Health     Financial Resource Strain:     Allergen Reactions    Zoloft [Sertraline]      IMMUNIZATIONS     There is no immunization history on file for this patient. REVIEW OF SYSTEMS   See HPI for further details and pertinent postiives. Negative for the following:  Constitutional: Negative for weight change. Negative for appetite change and fatigue. HENT: Negative for nosebleeds, sore throat, mouth sores, and voice change. Respiratory: Negative for cough, choking and chest tightness. Cardiovascular: Negative for chest pain   Gastrointestinal: See HPI  Musculoskeletal: Negative for arthralgias. Skin: Negative for pallor. Neurological: Negative for weakness and light-headedness. Hematological: Negative for adenopathy. Does not bruise/bleed easily. Psychiatric/Behavioral: Negative for suicidal ideas. PHYSICAL EXAM   VITAL SIGNS: /78 (Site: Right Wrist, Position: Sitting)   Pulse 95   Ht 5' 2\" (1.575 m)   Wt 124 lb 6.4 oz (56.4 kg)   BMI 22.75 kg/m²   Wt Readings from Last 3 Encounters:   11/10/21 124 lb 6.4 oz (56.4 kg)   09/25/21 140 lb (63.5 kg)   09/24/21 130 lb (59 kg)     Constitutional: Young female, well developed, Well nourished, No acute distress, Non-toxic appearance. HENT: Normocephalic, Atraumatic, Bilateral external ears normal, Oropharynx moist, No oral exudates, Nose normal.   Eyes: Conjunctiva normal, No discharge. Neck: Normal range of motion, No tenderness, Supple  Cardiovascular: Normal heart rate, Normal rhythm, No murmurs, No rubs, No gallops. Thorax & Lungs: Normal breath sounds, No respiratory distress, No wheezing, No chest tenderness. No gynecomastia. Abdomen: normal bowel sounds, soft, mild tenderness in epigastrium, non distended, no hernias   Rectal:  Deferred. Skin: Warm, Dry, No erythema, No rash  Lower Extremities: Intact distal pulses, No edema, No tenderness  Neurologic: Alert & oriented x 3, Normal motor function, Normal sensory function  RADIOLOGY/PROCEDURES   No results found. FINAL IMPRESSION   Rosario Cabello was seen today for new patient. Diagnoses and all orders for this visit:    Epigastric pain, cyclical vomiting and diarrhea; differentials include but not limited to cannabinoid hyperemesis syndrome. To rule out organic GI pathology including peptic ulcer disease, esophagitis, gastritis, acute pancreatitis, celiac disease  -     EGD with biopsy  -     LIPASE; Future  -     Tissue Transglutaminase Antobody IgA w Reflex; Future  -     IgA; Future  -     Avoid NSAIDs. -     omeprazole (PRILOSEC) 40 MG delayed release capsule; Take 1 capsule by mouth every morning (before breakfast)  -     Counseled on cannabinoid cessation. Reading material on cannabinoid hyperemesis syndrome provided. Patient agreeable and willing stop marijuana use. Esophagogastroduodenoscopy (EGD) with alternatives, rationale, benefits and risks, not limited to bleeding, infection, perforation, need of surgery, prolong hospital stay and anesthesia side effects were explained. Patient verbalized understanding and agrees to proceed with EGD. Orders Placed This Encounter   Procedures    LIPASE     Standing Status:   Future     Standing Expiration Date:   11/10/2022    Tissue Transglutaminase Antobody IgA w Reflex     Standing Status:   Future     Standing Expiration Date:   11/10/2022    IgA     Standing Status:   Future     Standing Expiration Date:   11/10/2022    EGD     Scheduling Instructions:      Schedule with anesthesia provided diprivan sedation. Please provide prep of choice instructions and prescription. General guidelines for holding blood thinners/anticoagulants around endoscopic procedure are but patients are encouraged to check with their prescribing physician. The patient may hold Plavix, Effient, Brilinta 5 days prior to the procedure unless:       A drug eluting stent has been placed within past 12 months.       A nondrug eluting stent has been placed within past 1 month. Coumadin may be held 4 days prior to the procedure unless:        Mechanical mitral valve replacement (requires heparin bridge while Coumadin held and is managed by pharmacy)      Pradaxa, Xarelto, Eliquis may be held 2-3 days prior to procedure. According to pharmacokinetics of the drug, package insert, cardiology practice patterns, and T1/2 of theses drugs (12 hrs), Eliquis and Xarelto are held 48hrs prior to any procedure, including major surgical procedures w/o       increased bleeding.  That is usually the standard of care, as coagulation would/should be normalized at 48hrs. Every attempt should be made to maintain ASA 81mg per day throughout the gabriela-operative period in patients with diagnosis of ASHD. These recommendations may need to be modified by the provider/ based on risk /benefit analysis of the procedure and the patients history. If anticoagulation can not be held because recent cardiac stent, elective endoscopic procedures should be delayed until they have received the minimum duration of recommended antiplatlet therapy and it can safely be held. Again if unsure, patient should discuss with prescribing physician/service. If anticoagulation can not be stopped, endoscopic procedures can still be performed either diagnostically at a somewhat higher risk. Understand that any therapeutic procedure where anything beyond looking is performed, carries higher risks. For this reason without overt bleeding other testing       such as cologuard may be more appropriate. High risk endoscopic procedures that require stopping antiplatelet and anticoagulation therapy include polypectomy, biliary or pancreatic sphincterotomy, pneumatic or bougie dilation, PEG placement, therapeutic balloon-assisted enteroscopy, EUS and FNA, tumor ablation by any technique,       cystogastrostomy,and treatment of varices.      Order Specific Question:   Screening or Diagnostic? Answer:   Diagnostic       ORDERED FUTURE/PENDING TESTS     Lab Frequency Next Occurrence   EEG Once 09/28/2021       FOLLOWUP   No follow-ups on file.           Lorenza Al MD 11/9/21 1:52 PM EST    CC:  NIKA Villafana - CNP

## 2021-11-10 ENCOUNTER — HOSPITAL ENCOUNTER (OUTPATIENT)
Age: 21
Discharge: HOME | End: 2021-11-10
Payer: COMMERCIAL

## 2021-11-10 ENCOUNTER — INITIAL CONSULT (OUTPATIENT)
Dept: GASTROENTEROLOGY | Age: 21
End: 2021-11-10
Payer: COMMERCIAL

## 2021-11-10 VITALS
HEIGHT: 62 IN | SYSTOLIC BLOOD PRESSURE: 132 MMHG | HEART RATE: 95 BPM | WEIGHT: 124.4 LBS | DIASTOLIC BLOOD PRESSURE: 78 MMHG | BODY MASS INDEX: 22.89 KG/M2

## 2021-11-10 DIAGNOSIS — R10.13 EPIGASTRIC PAIN: Primary | ICD-10-CM

## 2021-11-10 DIAGNOSIS — R10.13 EPIGASTRIC PAIN: ICD-10-CM

## 2021-11-10 LAB
IGA: 142 MG/DL (ref 70–400)
LIPASE: 48 U/L (ref 13–60)

## 2021-11-10 PROCEDURE — 99244 OFF/OP CNSLTJ NEW/EST MOD 40: CPT | Performed by: INTERNAL MEDICINE

## 2021-11-10 PROCEDURE — 82784 ASSAY IGA/IGD/IGG/IGM EACH: CPT

## 2021-11-10 PROCEDURE — 36415 COLL VENOUS BLD VENIPUNCTURE: CPT

## 2021-11-10 PROCEDURE — 83690 ASSAY OF LIPASE: CPT

## 2021-11-10 PROCEDURE — G8420 CALC BMI NORM PARAMETERS: HCPCS | Performed by: INTERNAL MEDICINE

## 2021-11-10 PROCEDURE — G8427 DOCREV CUR MEDS BY ELIG CLIN: HCPCS | Performed by: INTERNAL MEDICINE

## 2021-11-10 PROCEDURE — G8484 FLU IMMUNIZE NO ADMIN: HCPCS | Performed by: INTERNAL MEDICINE

## 2021-11-10 RX ORDER — OMEPRAZOLE 40 MG/1
40 CAPSULE, DELAYED RELEASE ORAL
Qty: 90 CAPSULE | Refills: 1 | Status: ON HOLD | OUTPATIENT
Start: 2021-11-10 | End: 2022-06-09

## 2021-11-10 NOTE — PATIENT INSTRUCTIONS
Patient Education        Abdominal Pain: Care Instructions  Your Care Instructions     Abdominal pain has many possible causes. Some aren't serious and get better on their own in a few days. Others need more testing and treatment. If your pain continues or gets worse, you need to be rechecked and may need more tests to find out what is wrong. You may need surgery to correct the problem. Don't ignore new symptoms, such as fever, nausea and vomiting, urination problems, pain that gets worse, and dizziness. These may be signs of a more serious problem. Your doctor may have recommended a follow-up visit in the next 8 to 12 hours. If you are not getting better, you may need more tests or treatment. The doctor has checked you carefully, but problems can develop later. If you notice any problems or new symptoms, get medical treatment right away. Follow-up care is a key part of your treatment and safety. Be sure to make and go to all appointments, and call your doctor if you are having problems. It's also a good idea to know your test results and keep a list of the medicines you take. How can you care for yourself at home? · Rest until you feel better. · To prevent dehydration, drink plenty of fluids. Choose water and other clear liquids until you feel better. If you have kidney, heart, or liver disease and have to limit fluids, talk with your doctor before you increase the amount of fluids you drink. · If your stomach is upset, eat mild foods, such as rice, dry toast or crackers, bananas, and applesauce. Try eating several small meals instead of two or three large ones. · Wait until 48 hours after all symptoms have gone away before you have spicy foods, alcohol, and drinks that contain caffeine. · Do not eat foods that are high in fat. · Avoid anti-inflammatory medicines such as aspirin, ibuprofen (Advil, Motrin), and naproxen (Aleve). These can cause stomach upset.  Talk to your doctor if you take daily aspirin for another health problem. When should you call for help? Call 911 anytime you think you may need emergency care. For example, call if:    · You passed out (lost consciousness).     · You pass maroon or very bloody stools.     · You vomit blood or what looks like coffee grounds.     · You have new, severe belly pain. Call your doctor now or seek immediate medical care if:    · Your pain gets worse, especially if it becomes focused in one area of your belly.     · You have a new or higher fever.     · Your stools are black and look like tar, or they have streaks of blood.     · You have unexpected vaginal bleeding.     · You have symptoms of a urinary tract infection. These may include:  ? Pain when you urinate. ? Urinating more often than usual.  ? Blood in your urine.     · You are dizzy or lightheaded, or you feel like you may faint. Watch closely for changes in your health, and be sure to contact your doctor if:    · You are not getting better after 1 day (24 hours). Where can you learn more? Go to https://Dakim.Diagnoplex. org and sign in to your Partnerp[employer] account. Enter S195 in the [employer] box to learn more about \"Abdominal Pain: Care Instructions. \"     If you do not have an account, please click on the \"Sign Up Now\" link. Current as of: July 1, 2021               Content Version: 13.0  © 0460-5026 Healthwise, Incorporated. Care instructions adapted under license by [facility] ([facility]). If you have questions about a medical condition or this instruction, always ask your healthcare professional. [place] [address] any warranty or liability for your use of this information.

## 2021-11-11 ENCOUNTER — TELEPHONE (OUTPATIENT)
Dept: GASTROENTEROLOGY | Age: 21
End: 2021-11-11

## 2021-12-17 ENCOUNTER — HOSPITAL ENCOUNTER (OUTPATIENT)
Age: 21
Discharge: HOME | End: 2021-12-17
Payer: COMMERCIAL

## 2021-12-17 PROCEDURE — U0005 INFEC AGEN DETEC AMPLI PROBE: HCPCS

## 2021-12-17 PROCEDURE — U0003 INFECTIOUS AGENT DETECTION BY NUCLEIC ACID (DNA OR RNA); SEVERE ACUTE RESPIRATORY SYNDROME CORONAVIRUS 2 (SARS-COV-2) (CORONAVIRUS DISEASE [COVID-19]), AMPLIFIED PROBE TECHNIQUE, MAKING USE OF HIGH THROUGHPUT TECHNOLOGIES AS DESCRIBED BY CMS-2020-01-R: HCPCS

## 2021-12-18 LAB — SARS-COV-2: NOT DETECTED

## 2021-12-22 ENCOUNTER — ANESTHESIA EVENT (OUTPATIENT)
Dept: ENDOSCOPY | Age: 21
End: 2021-12-22
Payer: COMMERCIAL

## 2021-12-22 NOTE — ANESTHESIA PRE PROCEDURE
Department of Anesthesiology  Preprocedure Note       Name:  Jacqueline Avalos   Age:  24 y.o.  :  2000                                          MRN:  [id number]         Date:  2021      Surgeon: Madhuri Nunez):  Lorenza Al MD    Procedure: Procedure(s):  EGD W/ANES. (13:30)    Medications prior to admission:   Prior to Admission medications    Medication Sig Start Date End Date Taking? Authorizing Provider   omeprazole (PRILOSEC) 40 MG delayed release capsule Take 1 capsule by mouth every morning (before breakfast) 11/10/21 12/10/21  Lorenza Al MD   LORazepam (ATIVAN) 1 MG tablet Take 1 mg by mouth every 6 hours as needed for Anxiety. Historical Provider, MD   sucralfate (CARAFATE) 1 GM tablet Take 1 tablet by mouth 4 times daily for 10 days 9/25/21 10/5/21  Nilo Grayson MD   ondansetron (ZOFRAN ODT) 4 MG disintegrating tablet Take 1 tablet by mouth every 12 hours as needed for Nausea 21   Colletta Peacemaker, DO       Current medications:    No current facility-administered medications for this encounter. Current Outpatient Medications   Medication Sig Dispense Refill    omeprazole (PRILOSEC) 40 MG delayed release capsule Take 1 capsule by mouth every morning (before breakfast) 90 capsule 1    LORazepam (ATIVAN) 1 MG tablet Take 1 mg by mouth every 6 hours as needed for Anxiety.  sucralfate (CARAFATE) 1 GM tablet Take 1 tablet by mouth 4 times daily for 10 days 40 tablet 0    ondansetron (ZOFRAN ODT) 4 MG disintegrating tablet Take 1 tablet by mouth every 12 hours as needed for Nausea 20 tablet 0       Allergies: Allergies   Allergen Reactions    Zoloft [Sertraline]        Problem List:  There is no problem list on file for this patient. Past Medical History:        Diagnosis Date    Kidney stone        Past Surgical History:  No past surgical history on file.     Social History:    Social History     Tobacco Use    Smoking status: Current Every Day Smoker Packs/day: 0.50     Types: Cigarettes    Smokeless tobacco: Never Used   Substance Use Topics    Alcohol use: No                                Ready to quit: Not Answered  Counseling given: Not Answered      Vital Signs (Current): There were no vitals filed for this visit. BP Readings from Last 3 Encounters:   11/10/21 132/78   09/25/21 (!) 132/97   09/24/21 (!) 131/98       NPO Status:                                                                                 BMI:   Wt Readings from Last 3 Encounters:   11/10/21 124 lb 6.4 oz (56.4 kg)   09/25/21 140 lb (63.5 kg)   09/24/21 130 lb (59 kg)     There is no height or weight on file to calculate BMI.    CBC:   Lab Results   Component Value Date    WBC 12.5 09/25/2021    RBC 4.67 09/25/2021    HGB 13.6 09/25/2021    HCT 40.2 09/25/2021    MCV 86.1 09/25/2021    RDW 15.8 09/25/2021     09/25/2021       CMP:   Lab Results   Component Value Date     09/25/2021    K 3.3 09/25/2021    K 3.3 09/24/2021    CL 99 09/25/2021    CO2 15 09/25/2021    BUN 17 09/25/2021    CREATININE 0.8 09/25/2021    GFRAA >60 09/25/2021    AGRATIO 1.8 09/25/2021    LABGLOM >60 09/25/2021    GLUCOSE 99 09/25/2021    PROT 7.6 09/25/2021    CALCIUM 9.8 09/25/2021    BILITOT 0.7 09/25/2021    ALKPHOS 89 09/25/2021    AST 13 09/25/2021    ALT 10 09/25/2021       POC Tests: No results for input(s): POCGLU, POCNA, POCK, POCCL, POCBUN, POCHEMO, POCHCT in the last 72 hours.     Coags: No results found for: PROTIME, INR, APTT    HCG (If Applicable):   Lab Results   Component Value Date    PREGTESTUR Negative 09/03/2015        ABGs: No results found for: PHART, PO2ART, KCP4HCI, JEE1OZU, BEART, Q9VVXWEI     Type & Screen (If Applicable):  No results found for: LABABO, LABRH    Drug/Infectious Status (If Applicable):  No results found for: HIV, HEPCAB    COVID-19 Screening (If Applicable):   Lab Results   Component Value Date    COVID19 Not Detected results found for: PROTIME, INR, APTT      Eddie Salas MD   12/22/2021

## 2021-12-23 ENCOUNTER — HOSPITAL ENCOUNTER (OUTPATIENT)
Age: 21
Setting detail: OUTPATIENT SURGERY
Discharge: HOME | End: 2021-12-23
Attending: INTERNAL MEDICINE | Admitting: INTERNAL MEDICINE
Payer: COMMERCIAL

## 2021-12-23 ENCOUNTER — ANESTHESIA (OUTPATIENT)
Dept: ENDOSCOPY | Age: 21
End: 2021-12-23
Payer: COMMERCIAL

## 2021-12-23 VITALS
RESPIRATION RATE: 16 BRPM | DIASTOLIC BLOOD PRESSURE: 74 MMHG | OXYGEN SATURATION: 95 % | SYSTOLIC BLOOD PRESSURE: 104 MMHG

## 2021-12-23 VITALS
DIASTOLIC BLOOD PRESSURE: 83 MMHG | HEIGHT: 62 IN | OXYGEN SATURATION: 99 % | WEIGHT: 118 LBS | TEMPERATURE: 97.4 F | SYSTOLIC BLOOD PRESSURE: 135 MMHG | RESPIRATION RATE: 18 BRPM | HEART RATE: 79 BPM | BODY MASS INDEX: 21.71 KG/M2

## 2021-12-23 LAB — PREGNANCY, URINE: NEGATIVE

## 2021-12-23 PROCEDURE — 3609012400 HC EGD TRANSORAL BIOPSY SINGLE/MULTIPLE: Performed by: INTERNAL MEDICINE

## 2021-12-23 PROCEDURE — 2580000003 HC RX 258: Performed by: NURSE ANESTHETIST, CERTIFIED REGISTERED

## 2021-12-23 PROCEDURE — 2500000003 HC RX 250 WO HCPCS: Performed by: NURSE ANESTHETIST, CERTIFIED REGISTERED

## 2021-12-23 PROCEDURE — 84703 CHORIONIC GONADOTROPIN ASSAY: CPT

## 2021-12-23 PROCEDURE — 6360000002 HC RX W HCPCS: Performed by: NURSE ANESTHETIST, CERTIFIED REGISTERED

## 2021-12-23 PROCEDURE — 2709999900 HC NON-CHARGEABLE SUPPLY: Performed by: INTERNAL MEDICINE

## 2021-12-23 PROCEDURE — 88305 TISSUE EXAM BY PATHOLOGIST: CPT

## 2021-12-23 PROCEDURE — 3700000000 HC ANESTHESIA ATTENDED CARE: Performed by: INTERNAL MEDICINE

## 2021-12-23 PROCEDURE — 7100000010 HC PHASE II RECOVERY - FIRST 15 MIN: Performed by: INTERNAL MEDICINE

## 2021-12-23 PROCEDURE — 43239 EGD BIOPSY SINGLE/MULTIPLE: CPT | Performed by: INTERNAL MEDICINE

## 2021-12-23 PROCEDURE — 7100000011 HC PHASE II RECOVERY - ADDTL 15 MIN: Performed by: INTERNAL MEDICINE

## 2021-12-23 RX ORDER — LIDOCAINE HYDROCHLORIDE 20 MG/ML
INJECTION, SOLUTION INFILTRATION; PERINEURAL PRN
Status: DISCONTINUED | OUTPATIENT
Start: 2021-12-23 | End: 2021-12-23 | Stop reason: SDUPTHER

## 2021-12-23 RX ORDER — SODIUM CHLORIDE, SODIUM LACTATE, POTASSIUM CHLORIDE, CALCIUM CHLORIDE 600; 310; 30; 20 MG/100ML; MG/100ML; MG/100ML; MG/100ML
INJECTION, SOLUTION INTRAVENOUS CONTINUOUS
Status: DISCONTINUED | OUTPATIENT
Start: 2021-12-23 | End: 2021-12-23 | Stop reason: HOSPADM

## 2021-12-23 RX ORDER — PROPOFOL 10 MG/ML
INJECTION, EMULSION INTRAVENOUS PRN
Status: DISCONTINUED | OUTPATIENT
Start: 2021-12-23 | End: 2021-12-23 | Stop reason: SDUPTHER

## 2021-12-23 RX ORDER — SODIUM CHLORIDE, SODIUM LACTATE, POTASSIUM CHLORIDE, CALCIUM CHLORIDE 600; 310; 30; 20 MG/100ML; MG/100ML; MG/100ML; MG/100ML
INJECTION, SOLUTION INTRAVENOUS CONTINUOUS PRN
Status: DISCONTINUED | OUTPATIENT
Start: 2021-12-23 | End: 2021-12-23 | Stop reason: SDUPTHER

## 2021-12-23 RX ADMIN — LIDOCAINE HYDROCHLORIDE 60 MG: 20 INJECTION, SOLUTION INFILTRATION; PERINEURAL at 14:38

## 2021-12-23 RX ADMIN — PROPOFOL 200 MG: 10 INJECTION, EMULSION INTRAVENOUS at 14:38

## 2021-12-23 RX ADMIN — SODIUM CHLORIDE, POTASSIUM CHLORIDE, SODIUM LACTATE AND CALCIUM CHLORIDE: 600; 310; 30; 20 INJECTION, SOLUTION INTRAVENOUS at 14:35

## 2021-12-23 ASSESSMENT — PAIN - FUNCTIONAL ASSESSMENT: PAIN_FUNCTIONAL_ASSESSMENT: 0-10

## 2021-12-23 NOTE — H&P
Domi Escobar    [address] [place] [address] [address].  1 [facility] [place] [address]  Phone: [phone]       CHIEF COMPLAINT  EGD for epigastric pain, cyclical vomiting and diarrhea    SUBJECTIVE  Elizabeth Barr is a 24 y.o. female with medical history of anxiety, depression and cannabis use who returns for EGD for epigastric pain, cyclical vomiting and diarrhea. Date of last office visit and details: 11/10/2021  21 y.o. female Single [1] White (non-) [1] with medical history of anxiety, depression, cannabis use seen independently with referral for diarrhea and gastric reflux. Patient presents with her boyfriend's mother.     She reports epigastric pain of about 6 months duration. Pain is burning in nature, intermittent, radiating to the back, associated with recurrent, cyclical nausea, bilious vomiting, loose stools and seizure-like episodes. Hot showers and hot tubs relieve symptoms. Denies fever, chills, unintentional weight loss, constipation, hematochezia or melenic stools. Reports chronic cannabinoid use over the past 4 years and episodes of binge alcohol drinking 6 months ago.     She was seen in [facility] ED on 9/25/2021 with multiple complaints including seizures, epigastric abdominal pain, nausea and vomiting. Patient had been seen at [facility] ED on 9/24/2021 for suspected seizures. Evaluated by neurology and deemed not consistent with epilepsy. Patient recommendation for outpatient further work-up. Labs including CBC, BMP were unremarkable except low potassium 3. CT head was negative for acute intracranial pathology. Urine toxicology on 9/24/2021 noted positive for cannabis and benzodiazepine.     During today's encounter, patient's boyfriend's mother became confrontational and disagreable regarding discussion of likely diagnosis of cannabinoid hyperemesis syndrome.   She had to be excused from the room given her interruptions with the patient encounter.     Last Encounter Reviewed: None  Pertinent PMH, FH, SH is reviewed below. Last EGD: None  Last Colonoscopy: None    PAST MEDICAL HISTORY     Past Medical History:   Diagnosis Date    Hypokalemia     Kidney stone      FAMILY HISTORY   History reviewed. No pertinent family history. SOCIAL HISTORY     Social History     Socioeconomic History    Marital status: Single     Spouse name: Not on file    Number of children: Not on file    Years of education: Not on file    Highest education level: Not on file   Occupational History    Not on file   Tobacco Use    Smoking status: Current Every Day Smoker     Packs/day: 0.50     Types: Cigarettes    Smokeless tobacco: Never Used   Vaping Use    Vaping Use: Former    Substances: Nicotine, Flavoring    Devices: Disposable   Substance and Sexual Activity    Alcohol use: No    Drug use: Yes     Frequency: 7.0 times per week     Types: Marijuana Lela Bert)    Sexual activity: Not Currently   Other Topics Concern    Not on file   Social History Narrative    Not on file     Social Determinants of Health     Financial Resource Strain:     Difficulty of Paying Living Expenses: Not on file   Food Insecurity:     Worried About [address] in the Last Year: Not on file    [address] in the Last Year: Not on file   Transportation Needs:     Lack of Transportation (Medical): Not on file    Lack of Transportation (Non-Medical):  Not on file   Physical Activity:     Days of Exercise per Week: Not on file    Minutes of Exercise per Session: Not on file   Stress:     Feeling of Stress : Not on file   Social Connections:     Frequency of Communication with Friends and Family: Not on file    Frequency of Social Gatherings with Friends and Family: Not on file    Attends Religion Services: Not on file    Active Member of Clubs or Organizations: Not on file    Attends Club or Organization Meetings: Not on file    Marital Status: Not on file   Intimate Partner Violence:     Fear of Current or Ex-Partner: Not on file    Emotionally Abused: Not on file    Physically Abused: Not on file    Sexually Abused: Not on file   Housing Stability:     Unable to Pay for Housing in the Last Year: Not on file    Number of [place] in the Last Year: Not on file    Unstable Housing in the Last Year: Not on file     SURGICAL HISTORY   History reviewed. No pertinent surgical history. CURRENT MEDICATIONS   (This list may include medications prescribed during this encounter as epic can not insert only the list prior to this encounter.)    ALLERGIES     Allergies   Allergen Reactions    Zoloft [Sertraline]      IMMUNIZATIONS     There is no immunization history on file for this patient. REVIEW OF SYSTEMS   See HPI for further details and pertinent postiives. Negative for the following:  Constitutional: Negative for weight change. Negative for appetite change and fatigue. HENT: Negative for nosebleeds, sore throat, mouth sores, and voice change. Respiratory: Negative for cough, choking and chest tightness. Cardiovascular: Negative for chest pain   Gastrointestinal: See HPI  Musculoskeletal: Negative for arthralgias. Skin: Negative for pallor. Neurological: Negative for weakness and light-headedness. Hematological: Negative for adenopathy. Does not bruise/bleed easily. Psychiatric/Behavioral: Negative for suicidal ideas. PHYSICAL EXAM   VITAL SIGNS: /75   Pulse 70   Temp 97.4 °F (36.3 °C) (Temporal)   Resp 16   Ht 5' 2\" (1.575 m)   Wt 118 lb (53.5 kg)   LMP 11/30/2021   SpO2 98%   BMI 21.58 kg/m²   Wt Readings from Last 3 Encounters:   12/23/21 118 lb (53.5 kg)   11/10/21 124 lb 6.4 oz (56.4 kg)   09/25/21 140 lb (63.5 kg)        Constitutional: Young female, well developed, Well nourished, No acute distress, Non-toxic appearance.    HENT: Normocephalic, Atraumatic, Bilateral external ears normal, Oropharynx moist, No oral exudates, Nose normal.   Eyes: Conjunctiva normal, No discharge. Neck: Normal range of motion, No tenderness, Supple  Cardiovascular: Normal heart rate, Normal rhythm, No murmurs, No rubs, No gallops. Thorax & Lungs: Normal breath sounds, No respiratory distress, No wheezing, No chest tenderness. No gynecomastia. Abdomen: normal bowel sounds, soft, mild tenderness in epigastrium, non distended, no hernias   Rectal:  Deferred. Skin: Warm, Dry, No erythema, No rash  Lower Extremities: Intact distal pulses, No edema, No tenderness  Neurologic: Alert & oriented x 3, Normal motor function, Normal sensory function    FINAL IMPRESSION   EGD for epigastric pain, cyclical vomiting and diarrhea    Esophagogastroduodenoscopy (EGD) with alternatives, rationale, benefits and risks, not limited to bleeding, infection, perforation, need of surgery, prolong hospital stay and anesthesia side effects were explained. Patient verbalized understanding and agrees to proceed with EGD. ORDERED FUTURE/PENDING TESTS     Lab Frequency Next Occurrence   EEG Once 09/28/2021       FOLLOWUP   No follow-ups on file.     Lucy Navarrete MD 12/22/21 3:49 PM EST    CC:  NIKA Oakley - ERVIN

## 2021-12-23 NOTE — ANESTHESIA POSTPROCEDURE EVALUATION
Department of Anesthesiology  Postprocedure Note    Patient: Rell Vargas  MRN: [id number]  YOB: 2000  Date of evaluation: 12/23/2021  Time:  3:57 PM     Procedure Summary     Date: 12/23/21 Room / Location: [place] [address] / [facility]    Anesthesia Start: 1435 Anesthesia Stop: 1446    Procedure: EGD BIOPSY (N/A ) Diagnosis: (EPIGASTRIC PAIN)    Surgeons: Rito Rico MD Responsible Provider: Little Marc MD    Anesthesia Type: general ASA Status: 2          Anesthesia Type: general    Bryce Phase I: Bryce Score: 10    Bryce Phase II: Bryce Score: 10    Last vitals: Reviewed and per EMR flowsheets.        Anesthesia Post Evaluation    Comments: Postoperative Anesthesia Note    Name:    Rell Vargas  MRN:      [id number]    Patient Vitals in the past 12 hrs:  12/23/21 1454, BP:135/83, Temp:97.4 °F (36.3 °C), Temp src:Infrared, Pulse:79, Resp:18, SpO2:99 %  12/23/21 1447, BP:123/85, Temp:97.3 °F (36.3 °C), Temp src:Infrared, Pulse:54, Resp:16, SpO2:96 %  12/23/21 1255, BP:116/75, Temp:97.4 °F (36.3 °C), Temp src:Temporal, Pulse:70, Resp:16, SpO2:98 %, Height:5' 2\" (1.575 m), Weight:118 lb (53.5 kg)     LABS:    CBC  Lab Results       Component                Value               Date/Time                  WBC                      12.5 (H)            09/25/2021 08:52 PM        HGB                      13.6                09/25/2021 08:52 PM        HCT                      40.2                09/25/2021 08:52 PM        PLT                      316                 09/25/2021 08:52 PM   RENAL  Lab Results       Component                Value               Date/Time                  NA                       134 (L)             09/25/2021 08:52 PM        K                        3.3 (L)             09/25/2021 08:52 PM        K                        3.3 (L)             09/24/2021 05:16 PM        CL                       99                  09/25/2021 08:52 PM        CO2 15 (L)              09/25/2021 08:52 PM        BUN                      17                  09/25/2021 08:52 PM        CREATININE               0.8                 09/25/2021 08:52 PM        GLUCOSE                  99                  09/25/2021 08:52 PM   COAGS  No results found for: PROTIME, INR, APTT    Intake & Output:  @35KCQD@    Nausea & Vomiting:  No    Level of Consciousness:  Awake    Pain Assessment:  Adequate analgesia    Anesthesia Complications:  No apparent anesthetic complications    SUMMARY      Vital signs stable  OK to discharge from Stage I post anesthesia care.   Care transferred from Anesthesiology department on discharge from perioperative area

## 2021-12-23 NOTE — OP NOTE
[facility] [address] ,  [address] [place] [facility], [facility]  Phone: [phone] [facility],  [address] [place]  Phone: [phone]   VOV:[phone]    EGD Procedure Note    Patient: Librado Felix  : 2000    Procedure: Esophagogastroduodenoscopy with biopsy    Date:  2021     Endoscopist:  Madonna Nichols MD    Referring Physician: Saintclair Best, APRN - CNP    Preoperative Diagnosis: Nausea and vomiting    Anesthesia: Anesthesia: MAC  Sedation: Propofol per anesthesia  Start Time:   Stop Time: 144  ASA Class: 2  Mallampati: II (soft palate, uvula, fauces visible)    Indications: This is a 24y.o. year old female with medical history of anxiety, depression and cannabis use who returns for EGD for epigastric pain, cyclical vomiting and diarrhea. Procedure Details  Informed consent was obtained for the procedure, including conscious sedation. Risks of pancreatitis, infection, perforation, hemorrhage, adverse drug reaction and aspiration were discussed. The patient was placed in the left lateral decubitus position. Based on the pre-procedure assessment, including review of the patient's medical history, medications, allergies, and review of systems, she had been deemed to be an appropriate candidate for conscious sedation; she was therefore sedated with the medications listed above. She was monitored continuously with ECG tracing, pulse oximetry, blood pressure monitoring, and direct observation. A gastroscope was inserted into the mouth and advanced under direct vision to second portion of the duodenum. A careful inspection was made as the gastroscope was withdrawn, including a retroflexed view of the proximal stomach including views of the incisura and cardia; findings and interventions are described below. Appropriate photodocumentation Was Obtained.   If photos taken, they were ordered to be scanned into the medical record. Findings:  Normal upper and mid esophagus. No evidence of esophagitis. Regular Z-line at 36 cm from incisors  Mild erythematous mucosa in antrum and body of stomach suggestive of mild gastritis. Biopsies taken from antrum, incisura and body of stomach to evaluate for H. Pylori. Normal duodenal bulb and second portion of duodenum. Biopsies taken to evaluate for Celiac disease. Specimens: Was Obtained:    Complications:   None; patient tolerated the procedure well. Disposition:   PACU - hemodynamically stable. Estimated Blood loss:  none    Impression:   Normal upper and mid esophagus. No evidence of esophagitis. Regular Z-line at 36 cm from incisors  Mild gastritis. Biopsied  Normal duodenal bulb and second portion of duodenum. Biopsied    Recommendations:  -Continue acid suppression. ,   -Await pathology. ,  -Complete cannabis cessation  -Follow symptoms.         Mona Cowan MD 12/23/21 2:42 PM EST

## 2022-03-24 LAB
C-REACTIVE PROTEIN WIDE RANGE: <0.5 MG/DL (ref 0–0.9)
ERYTHROCYTE SEDIMENTATION RATE: 2 MM/HR (ref 0–20)
FERRITIN: 24.9 NG/ML (ref 6.2–137)
IRON: 90 UG/DL (ref 37–170)
TOTAL IRON BINDING CAPACITY: 360 UG/DL (ref 265–497)

## 2022-03-25 LAB — GAMMA GLUTAMYL TRANSFERASE: 14 IU/L (ref 0–60)

## 2022-04-12 LAB
ALBUMIN SERPL-MCNC: 4.1 G/DL (ref 3.5–5)
ALP BLD-CCNC: 49 U/L (ref 38–126)
ALT SERPL-CCNC: 16 U/L (ref 0–34)
AST SERPL-CCNC: 30 U/L (ref 14–36)
BILIRUB SERPL-MCNC: 0.4 MG/DL (ref 0.2–1.3)
BILIRUBIN DIRECT: 0 MG/DL (ref 0–0.3)
BILIRUBIN, INDIRECT: 0.2 MG/DL (ref 0–1.1)
HCG URINE: NEGATIVE
INTERNAL QC: NORMAL
INTERNAL QC: NORMAL
SARS-COV-2, NAA: NEGATIVE
TOTAL PROTEIN: 6.4 G/DL (ref 6.3–8.2)

## 2022-04-13 ENCOUNTER — TELEPHONE (OUTPATIENT)
Dept: SURGERY | Age: 22
End: 2022-04-13

## 2022-04-13 RX ORDER — ONDANSETRON 4 MG/1
4 TABLET, FILM COATED ORAL 3 TIMES DAILY PRN
Qty: 15 TABLET | Refills: 0 | Status: ON HOLD | OUTPATIENT
Start: 2022-04-13 | End: 2022-06-09

## 2022-04-13 NOTE — TELEPHONE ENCOUNTER
Michael Betancourt from [facility] OR called. She called patient for a f/u post op call from gallbladder surgery yesterday. She states the patient told her she has been vomiting since surgery yesterday and today, can not keep anything down, and has severe nausea. Please advise. She uses [facility] in [place].

## 2022-04-15 LAB
GDT REPLACEMENT: NORMAL
Lab: NORMAL
PATHOLOGY DIAGNOSIS: NORMAL
SIGNATURE: NORMAL
SPECIMEN: NORMAL
SPECIMEN: NORMAL

## 2022-06-08 ENCOUNTER — APPOINTMENT (OUTPATIENT)
Dept: GENERAL RADIOLOGY | Age: 22
End: 2022-06-08
Payer: COMMERCIAL

## 2022-06-08 ENCOUNTER — HOSPITAL ENCOUNTER (INPATIENT)
Age: 22
LOS: 3 days | Discharge: HOME | DRG: 756 | End: 2022-06-11
Attending: INTERNAL MEDICINE | Admitting: INTERNAL MEDICINE
Payer: COMMERCIAL

## 2022-06-08 ENCOUNTER — TELEPHONE (OUTPATIENT)
Dept: SURGERY | Age: 22
End: 2022-06-08

## 2022-06-08 ENCOUNTER — APPOINTMENT (OUTPATIENT)
Dept: CT IMAGING | Age: 22
End: 2022-06-08
Payer: COMMERCIAL

## 2022-06-08 ENCOUNTER — HOSPITAL ENCOUNTER (EMERGENCY)
Age: 22
Discharge: ANOTHER HOSPITAL | End: 2022-06-08
Attending: STUDENT IN AN ORGANIZED HEALTH CARE EDUCATION/TRAINING PROGRAM
Payer: COMMERCIAL

## 2022-06-08 VITALS
WEIGHT: 90 LBS | OXYGEN SATURATION: 98 % | RESPIRATION RATE: 23 BRPM | TEMPERATURE: 96.3 F | BODY MASS INDEX: 16.56 KG/M2 | HEART RATE: 75 BPM | HEIGHT: 62 IN | SYSTOLIC BLOOD PRESSURE: 101 MMHG | DIASTOLIC BLOOD PRESSURE: 55 MMHG

## 2022-06-08 DIAGNOSIS — G40.901 STATUS EPILEPTICUS (HCC): Primary | ICD-10-CM

## 2022-06-08 LAB
A/G RATIO: 2.4 (ref 1.1–2.2)
ALBUMIN SERPL-MCNC: 4.8 G/DL (ref 3.4–5)
ALP BLD-CCNC: 79 U/L (ref 40–129)
ALT SERPL-CCNC: 10 U/L (ref 10–40)
AMPHETAMINE SCREEN, URINE: ABNORMAL
ANION GAP SERPL CALCULATED.3IONS-SCNC: 18 MMOL/L (ref 3–16)
AST SERPL-CCNC: 12 U/L (ref 15–37)
BARBITURATE SCREEN URINE: ABNORMAL
BASOPHILS ABSOLUTE: 0 K/UL (ref 0–0.2)
BASOPHILS RELATIVE PERCENT: 0.1 %
BENZODIAZEPINE SCREEN, URINE: POSITIVE
BILIRUB SERPL-MCNC: 0.8 MG/DL (ref 0–1)
BUN BLDV-MCNC: 15 MG/DL (ref 7–20)
CALCIUM SERPL-MCNC: 9.1 MG/DL (ref 8.3–10.6)
CANNABINOID SCREEN URINE: POSITIVE
CHLORIDE BLD-SCNC: 104 MMOL/L (ref 99–110)
CO2: 16 MMOL/L (ref 21–32)
COCAINE METABOLITE SCREEN URINE: ABNORMAL
CREAT SERPL-MCNC: 0.6 MG/DL (ref 0.6–1.1)
EOSINOPHILS ABSOLUTE: 0 K/UL (ref 0–0.6)
EOSINOPHILS RELATIVE PERCENT: 0 %
ETHANOL: NORMAL MG/DL (ref 0–0.08)
GFR AFRICAN AMERICAN: >60
GFR NON-AFRICAN AMERICAN: >60
GLUCOSE BLD-MCNC: 90 MG/DL (ref 70–99)
HCG(URINE) PREGNANCY TEST: NEGATIVE
HCT VFR BLD CALC: 39.8 % (ref 36–48)
HEMOGLOBIN: 13.4 G/DL (ref 12–16)
LACTIC ACID: 8 MMOL/L (ref 0.4–2)
LYMPHOCYTES ABSOLUTE: 1.2 K/UL (ref 1–5.1)
LYMPHOCYTES RELATIVE PERCENT: 13.7 %
Lab: ABNORMAL
MAGNESIUM: 2 MG/DL (ref 1.8–2.4)
MCH RBC QN AUTO: 30.4 PG (ref 26–34)
MCHC RBC AUTO-ENTMCNC: 33.8 G/DL (ref 31–36)
MCV RBC AUTO: 90 FL (ref 80–100)
METHADONE SCREEN, URINE: ABNORMAL
MONOCYTES ABSOLUTE: 0.4 K/UL (ref 0–1.3)
MONOCYTES RELATIVE PERCENT: 5.2 %
NEUTROPHILS ABSOLUTE: 6.8 K/UL (ref 1.7–7.7)
NEUTROPHILS RELATIVE PERCENT: 81 %
OPIATE SCREEN URINE: ABNORMAL
OXYCODONE URINE: ABNORMAL
PDW BLD-RTO: 13.9 % (ref 12.4–15.4)
PH UA: 5
PHENCYCLIDINE SCREEN URINE: ABNORMAL
PLATELET # BLD: 236 K/UL (ref 135–450)
PMV BLD AUTO: 8.3 FL (ref 5–10.5)
POTASSIUM REFLEX MAGNESIUM: 3.4 MMOL/L (ref 3.5–5.1)
PROLACTIN: 5.9 NG/ML
PROPOXYPHENE SCREEN: ABNORMAL
RBC # BLD: 4.42 M/UL (ref 4–5.2)
SODIUM BLD-SCNC: 138 MMOL/L (ref 136–145)
TOTAL CK: 73 U/L (ref 26–192)
TOTAL PROTEIN: 6.8 G/DL (ref 6.4–8.2)
TROPONIN: <0.01 NG/ML
WBC # BLD: 8.4 K/UL (ref 4–11)

## 2022-06-08 PROCEDURE — 96365 THER/PROPH/DIAG IV INF INIT: CPT

## 2022-06-08 PROCEDURE — 2580000003 HC RX 258: Performed by: STUDENT IN AN ORGANIZED HEALTH CARE EDUCATION/TRAINING PROGRAM

## 2022-06-08 PROCEDURE — 6360000002 HC RX W HCPCS

## 2022-06-08 PROCEDURE — 94761 N-INVAS EAR/PLS OXIMETRY MLT: CPT

## 2022-06-08 PROCEDURE — 51702 INSERT TEMP BLADDER CATH: CPT

## 2022-06-08 PROCEDURE — 85025 COMPLETE CBC W/AUTO DIFF WBC: CPT

## 2022-06-08 PROCEDURE — 84484 ASSAY OF TROPONIN QUANT: CPT

## 2022-06-08 PROCEDURE — 96375 TX/PRO/DX INJ NEW DRUG ADDON: CPT

## 2022-06-08 PROCEDURE — 5A1935Z RESPIRATORY VENTILATION, LESS THAN 24 CONSECUTIVE HOURS: ICD-10-PCS | Performed by: INTERNAL MEDICINE

## 2022-06-08 PROCEDURE — 94002 VENT MGMT INPAT INIT DAY: CPT

## 2022-06-08 PROCEDURE — 82550 ASSAY OF CK (CPK): CPT

## 2022-06-08 PROCEDURE — 83735 ASSAY OF MAGNESIUM: CPT

## 2022-06-08 PROCEDURE — 93005 ELECTROCARDIOGRAM TRACING: CPT | Performed by: STUDENT IN AN ORGANIZED HEALTH CARE EDUCATION/TRAINING PROGRAM

## 2022-06-08 PROCEDURE — 99285 EMERGENCY DEPT VISIT HI MDM: CPT

## 2022-06-08 PROCEDURE — 70450 CT HEAD/BRAIN W/O DYE: CPT

## 2022-06-08 PROCEDURE — 83605 ASSAY OF LACTIC ACID: CPT

## 2022-06-08 PROCEDURE — 84703 CHORIONIC GONADOTROPIN ASSAY: CPT

## 2022-06-08 PROCEDURE — 2000000000 HC ICU R&B

## 2022-06-08 PROCEDURE — 80307 DRUG TEST PRSMV CHEM ANLYZR: CPT

## 2022-06-08 PROCEDURE — 82077 ASSAY SPEC XCP UR&BREATH IA: CPT

## 2022-06-08 PROCEDURE — 2500000003 HC RX 250 WO HCPCS

## 2022-06-08 PROCEDURE — 96368 THER/DIAG CONCURRENT INF: CPT

## 2022-06-08 PROCEDURE — 96366 THER/PROPH/DIAG IV INF ADDON: CPT

## 2022-06-08 PROCEDURE — 96376 TX/PRO/DX INJ SAME DRUG ADON: CPT

## 2022-06-08 PROCEDURE — 80053 COMPREHEN METABOLIC PANEL: CPT

## 2022-06-08 PROCEDURE — 6360000002 HC RX W HCPCS: Performed by: STUDENT IN AN ORGANIZED HEALTH CARE EDUCATION/TRAINING PROGRAM

## 2022-06-08 PROCEDURE — 71045 X-RAY EXAM CHEST 1 VIEW: CPT

## 2022-06-08 PROCEDURE — 2700000000 HC OXYGEN THERAPY PER DAY

## 2022-06-08 PROCEDURE — 2500000003 HC RX 250 WO HCPCS: Performed by: STUDENT IN AN ORGANIZED HEALTH CARE EDUCATION/TRAINING PROGRAM

## 2022-06-08 PROCEDURE — 36415 COLL VENOUS BLD VENIPUNCTURE: CPT

## 2022-06-08 PROCEDURE — 84146 ASSAY OF PROLACTIN: CPT

## 2022-06-08 PROCEDURE — 31500 INSERT EMERGENCY AIRWAY: CPT

## 2022-06-08 RX ORDER — POLYETHYLENE GLYCOL 3350 17 G/17G
17 POWDER, FOR SOLUTION ORAL DAILY PRN
Status: DISCONTINUED | OUTPATIENT
Start: 2022-06-08 | End: 2022-06-11 | Stop reason: HOSPADM

## 2022-06-08 RX ORDER — ETOMIDATE 2 MG/ML
20 INJECTION INTRAVENOUS ONCE
Status: COMPLETED | OUTPATIENT
Start: 2022-06-08 | End: 2022-06-08

## 2022-06-08 RX ORDER — SODIUM CHLORIDE 9 MG/ML
INJECTION, SOLUTION INTRAVENOUS PRN
Status: DISCONTINUED | OUTPATIENT
Start: 2022-06-08 | End: 2022-06-11 | Stop reason: HOSPADM

## 2022-06-08 RX ORDER — ONDANSETRON 4 MG/1
4 TABLET, ORALLY DISINTEGRATING ORAL EVERY 8 HOURS PRN
Status: DISCONTINUED | OUTPATIENT
Start: 2022-06-08 | End: 2022-06-11 | Stop reason: HOSPADM

## 2022-06-08 RX ORDER — KETAMINE HYDROCHLORIDE 100 MG/ML
50 INJECTION, SOLUTION INTRAMUSCULAR; INTRAVENOUS ONCE
Status: COMPLETED | OUTPATIENT
Start: 2022-06-08 | End: 2022-06-08

## 2022-06-08 RX ORDER — LORAZEPAM 2 MG/ML
1 INJECTION INTRAMUSCULAR EVERY 5 MIN PRN
Status: DISCONTINUED | OUTPATIENT
Start: 2022-06-08 | End: 2022-06-11 | Stop reason: HOSPADM

## 2022-06-08 RX ORDER — ETOMIDATE 2 MG/ML
20 INJECTION INTRAVENOUS ONCE
Status: DISCONTINUED | OUTPATIENT
Start: 2022-06-08 | End: 2022-06-08

## 2022-06-08 RX ORDER — ENOXAPARIN SODIUM 100 MG/ML
30 INJECTION SUBCUTANEOUS DAILY
Status: DISCONTINUED | OUTPATIENT
Start: 2022-06-09 | End: 2022-06-11 | Stop reason: HOSPADM

## 2022-06-08 RX ORDER — SODIUM CHLORIDE 0.9 % (FLUSH) 0.9 %
5-40 SYRINGE (ML) INJECTION PRN
Status: DISCONTINUED | OUTPATIENT
Start: 2022-06-08 | End: 2022-06-11 | Stop reason: HOSPADM

## 2022-06-08 RX ORDER — MIDAZOLAM HYDROCHLORIDE 1 MG/ML
5 INJECTION INTRAMUSCULAR; INTRAVENOUS ONCE
Status: COMPLETED | OUTPATIENT
Start: 2022-06-08 | End: 2022-06-08

## 2022-06-08 RX ORDER — ACETAMINOPHEN 650 MG/1
650 SUPPOSITORY RECTAL EVERY 6 HOURS PRN
Status: DISCONTINUED | OUTPATIENT
Start: 2022-06-08 | End: 2022-06-11 | Stop reason: HOSPADM

## 2022-06-08 RX ORDER — LORAZEPAM 2 MG/ML
1 INJECTION INTRAMUSCULAR ONCE
Status: COMPLETED | OUTPATIENT
Start: 2022-06-08 | End: 2022-06-08

## 2022-06-08 RX ORDER — PROPOFOL 10 MG/ML
5-50 INJECTION, EMULSION INTRAVENOUS CONTINUOUS
Status: DISCONTINUED | OUTPATIENT
Start: 2022-06-08 | End: 2022-06-09

## 2022-06-08 RX ORDER — AMMONIA INHALANTS 0.04 G/.3ML
INHALANT RESPIRATORY (INHALATION)
Status: DISCONTINUED
Start: 2022-06-08 | End: 2022-06-08 | Stop reason: WASHOUT

## 2022-06-08 RX ORDER — 0.9 % SODIUM CHLORIDE 0.9 %
1000 INTRAVENOUS SOLUTION INTRAVENOUS ONCE
Status: COMPLETED | OUTPATIENT
Start: 2022-06-08 | End: 2022-06-08

## 2022-06-08 RX ORDER — SUCCINYLCHOLINE CHLORIDE 20 MG/ML
80 INJECTION INTRAMUSCULAR; INTRAVENOUS ONCE
Status: COMPLETED | OUTPATIENT
Start: 2022-06-08 | End: 2022-06-08

## 2022-06-08 RX ORDER — SODIUM CHLORIDE 0.9 % (FLUSH) 0.9 %
5-40 SYRINGE (ML) INJECTION EVERY 12 HOURS SCHEDULED
Status: DISCONTINUED | OUTPATIENT
Start: 2022-06-09 | End: 2022-06-11 | Stop reason: HOSPADM

## 2022-06-08 RX ORDER — SUCCINYLCHOLINE CHLORIDE 20 MG/ML
50 INJECTION INTRAMUSCULAR; INTRAVENOUS ONCE
Status: COMPLETED | OUTPATIENT
Start: 2022-06-08 | End: 2022-06-08

## 2022-06-08 RX ORDER — ACETAMINOPHEN 325 MG/1
650 TABLET ORAL EVERY 6 HOURS PRN
Status: DISCONTINUED | OUTPATIENT
Start: 2022-06-08 | End: 2022-06-11 | Stop reason: HOSPADM

## 2022-06-08 RX ORDER — PROPOFOL 10 MG/ML
5-50 INJECTION, EMULSION INTRAVENOUS CONTINUOUS
Status: DISCONTINUED | OUTPATIENT
Start: 2022-06-08 | End: 2022-06-08 | Stop reason: HOSPADM

## 2022-06-08 RX ORDER — ONDANSETRON 2 MG/ML
4 INJECTION INTRAMUSCULAR; INTRAVENOUS EVERY 6 HOURS PRN
Status: DISCONTINUED | OUTPATIENT
Start: 2022-06-08 | End: 2022-06-11 | Stop reason: HOSPADM

## 2022-06-08 RX ADMIN — ETOMIDATE INJECTION 20 MG: 2 SOLUTION INTRAVENOUS at 16:28

## 2022-06-08 RX ADMIN — KETAMINE HYDROCHLORIDE 50 MG: 100 INJECTION, SOLUTION, CONCENTRATE INTRAMUSCULAR; INTRAVENOUS at 16:50

## 2022-06-08 RX ADMIN — KETAMINE HYDROCHLORIDE 10 MG/KG/HR: 50 INJECTION, SOLUTION INTRAMUSCULAR; INTRAVENOUS at 18:56

## 2022-06-08 RX ADMIN — LEVETIRACETAM 1500 MG: 100 INJECTION, SOLUTION INTRAVENOUS at 17:34

## 2022-06-08 RX ADMIN — SUCCINYLCHOLINE CHLORIDE 50 MG: 20 INJECTION, SOLUTION INTRAMUSCULAR; INTRAVENOUS at 16:30

## 2022-06-08 RX ADMIN — ETOMIDATE 20 MG: 2 INJECTION INTRAVENOUS at 16:17

## 2022-06-08 RX ADMIN — Medication 10 MG/HR: at 16:38

## 2022-06-08 RX ADMIN — MIDAZOLAM 5 MG: 1 INJECTION INTRAMUSCULAR; INTRAVENOUS at 16:22

## 2022-06-08 RX ADMIN — KETAMINE HYDROCHLORIDE 10 MG/KG/HR: 50 INJECTION, SOLUTION INTRAMUSCULAR; INTRAVENOUS at 20:22

## 2022-06-08 RX ADMIN — PROPOFOL 20 MCG/KG/MIN: 10 INJECTION, EMULSION INTRAVENOUS at 17:51

## 2022-06-08 RX ADMIN — MIDAZOLAM 5 MG: 1 INJECTION INTRAMUSCULAR; INTRAVENOUS at 16:31

## 2022-06-08 RX ADMIN — KETAMINE HYDROCHLORIDE 50 MG: 100 INJECTION INTRAMUSCULAR; INTRAVENOUS at 17:01

## 2022-06-08 RX ADMIN — KETAMINE HYDROCHLORIDE 0.2 MG/KG/HR: 50 INJECTION, SOLUTION INTRAMUSCULAR; INTRAVENOUS at 17:12

## 2022-06-08 RX ADMIN — SUCCINYLCHOLINE CHLORIDE 80 MG: 20 INJECTION, SOLUTION INTRAMUSCULAR; INTRAVENOUS at 16:18

## 2022-06-08 RX ADMIN — SODIUM CHLORIDE 1000 ML: 9 INJECTION, SOLUTION INTRAVENOUS at 15:01

## 2022-06-08 RX ADMIN — LORAZEPAM 1 MG: 2 INJECTION INTRAMUSCULAR; INTRAVENOUS at 16:04

## 2022-06-08 RX ADMIN — LORAZEPAM 1 MG: 2 INJECTION INTRAMUSCULAR; INTRAVENOUS at 15:00

## 2022-06-08 RX ADMIN — KETAMINE HYDROCHLORIDE 10 MG/KG/HR: 50 INJECTION, SOLUTION INTRAMUSCULAR; INTRAVENOUS at 21:38

## 2022-06-08 RX ADMIN — PROPOFOL 80 MCG/KG/MIN: 10 INJECTION, EMULSION INTRAVENOUS at 21:41

## 2022-06-08 ASSESSMENT — PULMONARY FUNCTION TESTS
PIF_VALUE: 13
PIF_VALUE: 15
PIF_VALUE: 15

## 2022-06-08 ASSESSMENT — PAIN - FUNCTIONAL ASSESSMENT: PAIN_FUNCTIONAL_ASSESSMENT: NONE - DENIES PAIN

## 2022-06-08 NOTE — ED NOTES
9850- Placed garcia catheter, used sterile procedure, collected urine specimen     Dorothea Andrade  06/08/22 [id number]

## 2022-06-08 NOTE — ED NOTES
Pt continues to have seizure like activity, multiple episodes. Difficult to arouse during and afterward. Plan for intubation.      Althea Rodríguez RN  06/08/22 4500

## 2022-06-08 NOTE — ED PROVIDER NOTES
Primary Care Physician: NIKA Donato - CNP   Attending Physician: No att. providers found     History   Chief Complaint   Patient presents with    Seizures     reportedly has been having seizures for nearly a year, does not see neuro. got 4mg versed, 8mg zofran from EMS. seen at [facility] this morning for same. FEROZ Ewing  is a 24 y.o. female history of seizure-like activity who presents accompanied by significant other complaining of a seizure. Patient was seen at [facility] this morning and she was discharged. She stated that when she went home she had 4 other seizure. EMS was called patient was given altogether 4 mg of Versed and it milligrams of Zofran. Somehow she ended up in the emergency room here. She continued to have seizures despite the fact that she was postictal.  She had multiple events almost fall making it a total of more than 8. She was alert oriented following commands. Denies any fevers chills but admits to nausea vomiting. Stated that in the past she feels like she has been having a seizure by some tingling in her mouth. Stated that she is been having this done frequently which is unusual for her. Past Medical History:   Diagnosis Date    Hypokalemia     Kidney stone         Past Surgical History:   Procedure Laterality Date    OTHER SURGICAL HISTORY  12/23/2021    EGD BIOPSY (N/A )    UPPER GASTROINTESTINAL ENDOSCOPY N/A 12/23/2021    EGD BIOPSY performed by Elena Castillo MD at [address] [place] [address]        History reviewed. No pertinent family history.      Social History     Socioeconomic History    Marital status: Single     Spouse name: None    Number of children: None    Years of education: None    Highest education level: None   Occupational History    None   Tobacco Use    Smoking status: Current Every Day Smoker     Packs/day: 0.50     Types: Cigarettes    Smokeless tobacco: Never Used   Vaping Use    Vaping Use: Former    Substances: Nicotine, Flavoring    Devices: Disposable   Substance and Sexual Activity    Alcohol use: No    Drug use: Yes     Frequency: 7.0 times per week     Types: Marijuana Johnson Adalberto)    Sexual activity: Not Currently   Other Topics Concern    None   Social History Narrative    None     Social Determinants of Health     Financial Resource Strain:     Difficulty of Paying Living Expenses: Not on file   Food Insecurity:     Worried About Running Out of Food in the Last Year: Not on file    Osmani of Food in the Last Year: Not on file   Transportation Needs:     Lack of Transportation (Medical): Not on file    Lack of Transportation (Non-Medical):  Not on file   Physical Activity:     Days of Exercise per Week: Not on file    Minutes of Exercise per Session: Not on file   Stress:     Feeling of Stress : Not on file   Social Connections:     Frequency of Communication with Friends and Family: Not on file    Frequency of Social Gatherings with Friends and Family: Not on file    Attends [employer] Services: Not on file    Active Member of [address] [place] [address] or Organizations: Not on file    Attends Club or Organization Meetings: Not on file    Marital Status: Not on file   Intimate Partner Violence:     Fear of Current or Ex-Partner: Not on file    Emotionally Abused: Not on file    Physically Abused: Not on file    Sexually Abused: Not on file   Housing Stability:     Unable to Pay for Housing in the Last Year: Not on file    Number of [place] in the Last Year: Not on file    Unstable Housing in the Last Year: Not on file        Review of Systems   10 total systems reviewed and found to be negative unless otherwise noted in HPI     Physical Exam   BP (!) 101/55   Pulse 75   Temp (!) 96.3 °F (35.7 °C) (Bladder)   Resp 23   Ht 5' 2\" (1.575 m)   Wt 90 lb (40.8 kg)   SpO2 98%   BMI 16.46 kg/m²      CONSTITUTIONAL: Well appearing, in no acute distress   HEAD: atraumatic, normocephalic   EYES: PERRL, No injection, discharge or scleral icterus. ENT: Moist mucous membranes. NECK: Normal ROM, NO LAD   CARDIOVASCULAR: Regular rate and rhythm. No murmurs or gallop. PULMONARY/CHEST: Airway patent. No retractions. Breath sounds clear with good air entry bilaterally. ABDOMEN: Soft, Non-distended and non-tender, without guarding or rebound. SKIN: Acyanotic, warm, dry   MUSCULOSKELETAL: No swelling, tenderness or deformity   NEUROLOGICAL: Awake and oriented x 3. Pulses intact. Grossly nonfocal   Nursing note and vitals reviewed.      ED Course & Medical Decision Making   Medications   midazolam (VERSED) 1 mg/mL in D5W infusion (0 mg/hr IntraVENous Patient Transferred to Other Facility 6/8/22 2158)   ketamine (KETALAR) 500 mg in sodium chloride 0.9 % 250 mL infusion (0 mg/kg/hr × 40.8 kg IntraVENous Patient Transferred to Other Facility 6/8/22 2158)   propofol injection (0 mcg/kg/min × 40.8 kg IntraVENous Patient Transferred to Other Facility 6/8/22 2158)   0.9 % sodium chloride bolus (0 mLs IntraVENous Stopped 6/8/22 1608)   LORazepam (ATIVAN) injection 1 mg (1 mg IntraVENous Given 6/8/22 1500)   levETIRAcetam (KEPPRA) 1,500 mg in sodium chloride 0.9 % 100 mL IVPB (0 mg IntraVENous Stopped 6/8/22 1750)   LORazepam (ATIVAN) injection 1 mg (1 mg IntraVENous Given 6/8/22 1604)   etomidate (AMIDATE) injection 20 mg (20 mg IntraVENous Given 6/8/22 1628)   succinylcholine (ANECTINE) injection 80 mg (80 mg IntraVENous Given 6/8/22 1618)   midazolam (VERSED) injection 5 mg (5 mg IntraVENous Given 6/8/22 1622)   midazolam (VERSED) injection 5 mg (5 mg IntraVENous Given 6/8/22 1631)   etomidate (AMIDATE) injection 20 mg (20 mg IntraVENous Given 6/8/22 1617)   ketamine (KETALAR) injection 50 mg (50 mg IntraVENous Given 6/8/22 1701)   ketamine (KETALAR) injection 50 mg (50 mg IntraVENous Given 6/8/22 1650)   succinylcholine (ANECTINE) injection 50 mg (50 mg IntraVENous Given 6/8/22 1630)      Labs Reviewed   COMPREHENSIVE METABOLIC PANEL W/ REFLEX TO MG FOR LOW K - Abnormal; Notable for the following components:       Result Value    Potassium reflex Magnesium 3.4 (*)     CO2 16 (*)     Anion Gap 18 (*)     Albumin/Globulin Ratio 2.4 (*)     AST 12 (*)     All other components within normal limits   LACTIC ACID - Abnormal; Notable for the following components:    Lactic Acid 8.0 (*)     All other components within normal limits    Narrative:     Meera Wagner  [facility] tel. [phone],  Chemistry results called to and read back by Tracee Keith RN, 06/08/2022  14:53, by Kary 115 - Abnormal; Notable for the following components:    Benzodiazepine Screen, Urine POSITIVE (*)     Cannabinoid Scrn, Ur POSITIVE (*)     All other components within normal limits   CBC WITH AUTO DIFFERENTIAL   TROPONIN   CK   PROLACTIN   MAGNESIUM   ETHANOL   PREGNANCY, URINE      XR CHEST PORTABLE   Final Result   Successful intubation with endotracheal tube 3 cm above the christelle. Nasogastric tube in the stomach. CT Head WO Contrast   Final Result      No acute intracranial abnormality noted. EKG INTERPRETATION:  EKG by my preliminary interpretation shows sinus rhythm with rate of 81, normal axis, normal intervals, with no ST changes indicative of ischemia at this time. PROCEDURES:   Intubation    Date/Time: 6/8/2022 10:24 PM  Performed by: Jeanne Yee MD  Authorized by: Jeanne Yee MD     Consent:     Consent obtained:  Emergent situation and verbal    Consent given by:  Patient and spouse    Risks discussed:  Aspiration and bleeding    Alternatives discussed:  No treatment  Pre-procedure details:     Patient status:  Awake    Mallampati score:  II    Pretreatment medications:  None    Paralytics:  Succinylcholine  Procedure details:     Preoxygenation:  Bag valve mask    CPR in progress: no      Intubation method:  Oral    Oral intubation technique:  Lighted stylet    Laryngoscope blade:   Mac 4    Tube size (mm):  7.0 Tube type:  Cuffed    Number of attempts:  2    Ventilation between attempts: no      Cricoid pressure: no      Tube visualized through cords: yes    Placement assessment:     Tube secured with:  ETT sexton    Breath sounds:  Equal    Placement verification: CXR verification    Post-procedure details:     Patient tolerance of procedure: Tolerated well, no immediate complications        ASSESSMENT AND PLAN:  [id number] is a 24 y.o. female history of seizure-like activity who presents accompanied by significant other complaining of a seizure. Patient was seen at [facility] this morning and she was discharged. She stated that when she went home she had 4 other seizure. EMS was called patient was given altogether 4 mg of Versed and it milligrams of Zofran. Somehow she ended up in the emergency room here. She continued to have seizures despite the fact that she was postictal.  She had multiple events almost fall making it a total of more than 8. She was alert oriented following commands. Denies any fevers chills but admits to nausea vomiting. Stated that in the past she feels like she has been having a seizure by some tingling in her mouth. Stated that she is been having this done frequently which is unusual for her. Patient had multiple seizure-like activities. She was evaluated for seizure with a lactate which was 8. Every time she had a seizure activity heart rate went up to 140s. Because events with frequent she had multiple more than 8 decided to intubate her for status epilepticus. It is a possibility that this is pseudoseizures however elevated lactate is a possibility that this could be a seizure event. Review of documentation shows that patient was recommended EGD in the past but never followed up. She was sedated with Versed as was still having jerking movements. Ketamine was placed Versed again with no relief.   Finally added propofol when she or her movements subsided. Consulted with hospitalist at [facility] patient was transferred to [facility] for further evaluation and EEG. ClINICAL IMPRESSION:  1. Status epilepticus ([address]        DISPOSITION Decision To Transfer 06/08/2022 02:54:20 PM  ___________________________________________________________________________________  CRITICAL CARE NOTE:  There was a high probability of clinically significant life-threatening deterioration of the patient's condition requiring my urgent intervention. This includes multiple reevaluations, vital sign monitoring, pulse oximetry monitoring, telemetry monitoring, clinical response to the IV medications, reviewing the nursing notes, consultation time, dictation/documentation time, and interpretation of the labwork. (This time excludes time spent performing procedures). I personally saw the patient and independently provided 74 minutes of non-concurrent critical care out of the total shared critical care time provided. _________________________________________________________________________________________  This record is transcribed utilizing voice recognition technology. There are inherent limitations in this technology. In addition, there may be limitations in editing of this report. If there are any discrepancies, please contact me directly.         Breann Bowling MD  06/08/22 3074

## 2022-06-08 NOTE — ED NOTES
Transfer to [facility] for Neurology  [address] [facility] Victoriano Alanis RN) start new case. Dx: Seizure  Bed Requested: Med-Surg/Tele  [address] called [facility] RN Supervisor and they said they have a bed for pt. Alexandra Cummins  06/08/22 1536    Transport Needs:   -Cardiac Monitor  -Hep [place] Mandujano  06/08/22 1543    1605 - Called to update [facility] that we will have to intubate pt, will call back with critical care. (Maikel Hoffmann)     Alexandra Cummins  06/08/22 1611    1629 - [facility] called with Dr. Gilford Sievert on line, but Dr. Pride Room is having trouble and needed re-intubated. 26 - [facility] called to say that [facility] don't have ICU bed that is needed now and they reached out to [facility] and they have beds will be getting calls started for there. Alexandra Cummins  06/08/22 1639    1700 - [facility] called [facility] doctor. Dr. Cathy Hill  06/08/22 1710    Bed Assignment:   Room #: 4894  Report #: [id number]     Alexandra Cummins  06/08/22 8943    [address] called said that [facility] said to call them back at [address], to see if they have transport. [facility] said [facility] will have to call back periodically to see, they won't book ahead due to if one of their contract facilities need them to transport. SACHIN [address] [employer] is going to call and see what air care says just to see.       Alexandra Cummins  06/08/22 1920

## 2022-06-08 NOTE — ED NOTES
Pt intubated by Dr. Aurelia Matthew, successful x1 attempt. Shortly after, pt not being adequately sedated pulled at ET tube and dislodged it. Tube was removed and re-intubated by Dr. Aurelia Matthew x1 attempt with 7.5cm ETT, 25cm lip line. Pt fighting vent, pulling at tubes and cords. Requiring significant amounts of sedation to remain still and safe in bed. Bilateral soft wrist restraints in place.       John Kaur RN  06/08/22 2480

## 2022-06-08 NOTE — ED NOTES
[facility] spoke with Ranjit Vera, [facility] charge RN. [facility] asked if there was going to be an ETA at all for tonight as it is not possible for a transport right now. She said that they will call the transport agencies back and see if there will be anyone who can accept tonight. Will call back with update.       Todd Valentin  06/08/22 2900

## 2022-06-08 NOTE — ED NOTES
Call placed to [address] [place] [address] and [facility] to see if Air was flying. I was told no due to multiple severe weather warnings. I asked regarding the MICU and was told the earliest possible ETA is 2130 or 2200. I booked the time slot, gave all patient demographics, background information, and medical necessity for MICU transport.  I printed and faxed a face sheet to [facility]      Evette Arechiga  06/08/22 1927

## 2022-06-08 NOTE — TELEPHONE ENCOUNTER
The number listed below was a wrong number. I called pts number and left message for her to call back. And the fiance is not listed on her hipaa, so we can not give him any information. Per Brii BUTT, she will need to go to ER if she is vomiting blood.

## 2022-06-08 NOTE — ED NOTES
Pt appears adequately sedated at this time, has gone through entire bag of Ketamine. Called pharmacy to get new bag sent down.      Herlinda Pitts RN  06/08/22 0407

## 2022-06-08 NOTE — TELEPHONE ENCOUNTER
Pts Ki called in stating that Levi Morin has been throwing up blood. They went to the ER previously and they sent her home with nausea medication. He states she cannot go see her PCP due to her PCP being very sick and out of the office. Pt had surgery April 12 th, 2022 at [facility]. Kylee advised me to tell them to go to the ER, which in return he said they have already been. He said he is unsure what to do.     You can reach her ki Foster at [phone]

## 2022-06-08 NOTE — ED NOTES
Dr. Nubia Lincoln at bedside to explain to patient and fiance risks of continued seizures, reasons for intubation, both verbalized understanding.      Juani Medrano RN  06/08/22 1597

## 2022-06-08 NOTE — ED NOTES
Patient observed thrashing in bed. During the thrashing patient grabbed the side rails, raised her head and looked around. Patient made eye contact with writer and immediately stopped thrashing, laid back down and started to rub her belly and groan.      Radha Rojas RN  06/08/22 8040

## 2022-06-08 NOTE — PROGRESS NOTES
06/08/22 1635   NICU Vent Information   $Ventilation $Initial Day   Ventilator Started Yes   Skin Assessment Clean, dry, & intact   Ventilator    Vent Type 980   Vent Mode AC/VC   Vt (Set, mL) 320 mL   Vt Exhaled 331 mL   Resp Rate (Set) 12 bmp   Rate Measured 12 br/min   FiO2  100 %   Peak Inspiratory Pressure 13 cmH2O   I:E Ratio 1.3.4   Sensitivity 3   PEEP/CPAP (cmH2O) 3   Mean Airway Pressure 10 cmH20   Breath Sounds   Right Upper Lobe Clear   Right Middle Lobe Clear   Right Lower Lobe Clear   Left Upper Lobe Clear   Left Lower Lobe Clear   Additional Respiratory Assessments   Heart Rate (!) 103   Resp 22   SpO2 99 %   Vent Alarm Settings   High Pressure  40 cmH2O   Ve Max 15   Ve Min 1.85   Vt Low  190 mL   Vt High  700 mL   RR High 40 br/min   Apnea (Secs) 20 secs   Ambu Bag With Mask At Bedside Yes   Patient Observation   Observations 7.0 TUBE 23 @ THE LIP   intubated with 7.0 ett, checked with xray, etco2 and breath sounds.  Pt resting comfortabtly at this time

## 2022-06-08 NOTE — ED NOTES
MAXIMO (ED manager) on the phone with [facility].      Eugenie Ninoing  06/08/22 8018

## 2022-06-08 NOTE — ED NOTES
Pt observed in bed, thrashing all extremities around. Incomprehensible speech. Pt able to open eyes when approached by staff while continuing to move around in bed. Behaviors subsided after several minutes.      Maycol Coughlin RN  06/08/22 1982

## 2022-06-09 ENCOUNTER — APPOINTMENT (OUTPATIENT)
Dept: MRI IMAGING | Age: 22
DRG: 756 | End: 2022-06-09
Attending: INTERNAL MEDICINE
Payer: COMMERCIAL

## 2022-06-09 PROBLEM — R56.9 SEIZURE-LIKE ACTIVITY (HCC): Status: ACTIVE | Noted: 2022-06-09

## 2022-06-09 LAB
A/G RATIO: 2.5 (ref 1.1–2.2)
ALBUMIN SERPL-MCNC: 4 G/DL (ref 3.4–5)
ALP BLD-CCNC: 77 U/L (ref 40–129)
ALT SERPL-CCNC: 10 U/L (ref 10–40)
ANION GAP SERPL CALCULATED.3IONS-SCNC: 12 MMOL/L (ref 3–16)
AST SERPL-CCNC: 12 U/L (ref 15–37)
BASOPHILS ABSOLUTE: 0 K/UL (ref 0–0.2)
BASOPHILS RELATIVE PERCENT: 0.2 %
BILIRUB SERPL-MCNC: 0.9 MG/DL (ref 0–1)
BUN BLDV-MCNC: 10 MG/DL (ref 7–20)
CALCIUM SERPL-MCNC: 8.4 MG/DL (ref 8.3–10.6)
CHLORIDE BLD-SCNC: 109 MMOL/L (ref 99–110)
CO2: 22 MMOL/L (ref 21–32)
CREAT SERPL-MCNC: 0.6 MG/DL (ref 0.6–1.1)
EKG ATRIAL RATE: 81 BPM
EKG DIAGNOSIS: NORMAL
EKG P AXIS: 65 DEGREES
EKG P-R INTERVAL: 122 MS
EKG Q-T INTERVAL: 398 MS
EKG QRS DURATION: 78 MS
EKG QTC CALCULATION (BAZETT): 462 MS
EKG R AXIS: 50 DEGREES
EKG T AXIS: 33 DEGREES
EKG VENTRICULAR RATE: 81 BPM
EOSINOPHILS ABSOLUTE: 0 K/UL (ref 0–0.6)
EOSINOPHILS RELATIVE PERCENT: 0.2 %
GFR AFRICAN AMERICAN: >60
GFR NON-AFRICAN AMERICAN: >60
GLUCOSE BLD-MCNC: 104 MG/DL (ref 70–99)
GLUCOSE BLD-MCNC: 106 MG/DL (ref 70–99)
GLUCOSE BLD-MCNC: 109 MG/DL (ref 70–99)
GLUCOSE BLD-MCNC: 57 MG/DL (ref 70–99)
GLUCOSE BLD-MCNC: 63 MG/DL (ref 70–99)
GLUCOSE BLD-MCNC: 70 MG/DL (ref 70–99)
GLUCOSE BLD-MCNC: 73 MG/DL (ref 70–99)
GLUCOSE BLD-MCNC: 80 MG/DL (ref 70–99)
HCT VFR BLD CALC: 38.9 % (ref 36–48)
HEMOGLOBIN: 12.7 G/DL (ref 12–16)
LACTIC ACID: 0.9 MMOL/L (ref 0.4–2)
LYMPHOCYTES ABSOLUTE: 2.2 K/UL (ref 1–5.1)
LYMPHOCYTES RELATIVE PERCENT: 18.2 %
MAGNESIUM: 2 MG/DL (ref 1.8–2.4)
MCH RBC QN AUTO: 30.2 PG (ref 26–34)
MCHC RBC AUTO-ENTMCNC: 32.6 G/DL (ref 31–36)
MCV RBC AUTO: 92.6 FL (ref 80–100)
MONOCYTES ABSOLUTE: 0.9 K/UL (ref 0–1.3)
MONOCYTES RELATIVE PERCENT: 7.5 %
NEUTROPHILS ABSOLUTE: 9 K/UL (ref 1.7–7.7)
NEUTROPHILS RELATIVE PERCENT: 73.9 %
PDW BLD-RTO: 14 % (ref 12.4–15.4)
PERFORMED ON: ABNORMAL
PERFORMED ON: NORMAL
PERFORMED ON: NORMAL
PLATELET # BLD: 208 K/UL (ref 135–450)
PMV BLD AUTO: 8.4 FL (ref 5–10.5)
POTASSIUM REFLEX MAGNESIUM: 2.9 MMOL/L (ref 3.5–5.1)
POTASSIUM SERPL-SCNC: 3.2 MMOL/L (ref 3.5–5.1)
RBC # BLD: 4.2 M/UL (ref 4–5.2)
SODIUM BLD-SCNC: 143 MMOL/L (ref 136–145)
TOTAL PROTEIN: 5.6 G/DL (ref 6.4–8.2)
WBC # BLD: 12.2 K/UL (ref 4–11)

## 2022-06-09 PROCEDURE — 95813 EEG EXTND MNTR 61-119 MIN: CPT

## 2022-06-09 PROCEDURE — 6370000000 HC RX 637 (ALT 250 FOR IP): Performed by: INTERNAL MEDICINE

## 2022-06-09 PROCEDURE — 2580000003 HC RX 258: Performed by: INTERNAL MEDICINE

## 2022-06-09 PROCEDURE — 2700000000 HC OXYGEN THERAPY PER DAY

## 2022-06-09 PROCEDURE — 94003 VENT MGMT INPAT SUBQ DAY: CPT

## 2022-06-09 PROCEDURE — 2500000003 HC RX 250 WO HCPCS: Performed by: STUDENT IN AN ORGANIZED HEALTH CARE EDUCATION/TRAINING PROGRAM

## 2022-06-09 PROCEDURE — 2580000003 HC RX 258: Performed by: STUDENT IN AN ORGANIZED HEALTH CARE EDUCATION/TRAINING PROGRAM

## 2022-06-09 PROCEDURE — 80053 COMPREHEN METABOLIC PANEL: CPT

## 2022-06-09 PROCEDURE — 99223 1ST HOSP IP/OBS HIGH 75: CPT | Performed by: INTERNAL MEDICINE

## 2022-06-09 PROCEDURE — 94761 N-INVAS EAR/PLS OXIMETRY MLT: CPT

## 2022-06-09 PROCEDURE — 87641 MR-STAPH DNA AMP PROBE: CPT

## 2022-06-09 PROCEDURE — 36415 COLL VENOUS BLD VENIPUNCTURE: CPT

## 2022-06-09 PROCEDURE — 84132 ASSAY OF SERUM POTASSIUM: CPT

## 2022-06-09 PROCEDURE — A9579 GAD-BASE MR CONTRAST NOS,1ML: HCPCS | Performed by: NURSE PRACTITIONER

## 2022-06-09 PROCEDURE — 70553 MRI BRAIN STEM W/O & W/DYE: CPT

## 2022-06-09 PROCEDURE — 6360000004 HC RX CONTRAST MEDICATION: Performed by: NURSE PRACTITIONER

## 2022-06-09 PROCEDURE — 6360000002 HC RX W HCPCS: Performed by: INTERNAL MEDICINE

## 2022-06-09 PROCEDURE — 93010 ELECTROCARDIOGRAM REPORT: CPT | Performed by: INTERNAL MEDICINE

## 2022-06-09 PROCEDURE — 1200000000 HC SEMI PRIVATE

## 2022-06-09 PROCEDURE — 83735 ASSAY OF MAGNESIUM: CPT

## 2022-06-09 PROCEDURE — 6360000002 HC RX W HCPCS: Performed by: STUDENT IN AN ORGANIZED HEALTH CARE EDUCATION/TRAINING PROGRAM

## 2022-06-09 PROCEDURE — 85025 COMPLETE CBC W/AUTO DIFF WBC: CPT

## 2022-06-09 PROCEDURE — 83605 ASSAY OF LACTIC ACID: CPT

## 2022-06-09 PROCEDURE — 6370000000 HC RX 637 (ALT 250 FOR IP): Performed by: STUDENT IN AN ORGANIZED HEALTH CARE EDUCATION/TRAINING PROGRAM

## 2022-06-09 RX ORDER — DEXTROSE MONOHYDRATE 25 G/50ML
50 INJECTION, SOLUTION INTRAVENOUS ONCE
Status: DISCONTINUED | OUTPATIENT
Start: 2022-06-09 | End: 2022-06-09

## 2022-06-09 RX ORDER — POTASSIUM CHLORIDE 20 MEQ/1
20 TABLET, EXTENDED RELEASE ORAL 2 TIMES DAILY WITH MEALS
Status: DISCONTINUED | OUTPATIENT
Start: 2022-06-09 | End: 2022-06-10

## 2022-06-09 RX ORDER — POTASSIUM CHLORIDE 7.45 MG/ML
10 INJECTION INTRAVENOUS
Status: COMPLETED | OUTPATIENT
Start: 2022-06-09 | End: 2022-06-09

## 2022-06-09 RX ORDER — ESCITALOPRAM OXALATE 10 MG/1
10 TABLET ORAL DAILY
COMMUNITY

## 2022-06-09 RX ORDER — PROPOFOL 10 MG/ML
5-50 INJECTION, EMULSION INTRAVENOUS CONTINUOUS
Status: DISCONTINUED | OUTPATIENT
Start: 2022-06-09 | End: 2022-06-10

## 2022-06-09 RX ORDER — DEXTROSE MONOHYDRATE 50 MG/ML
100 INJECTION, SOLUTION INTRAVENOUS PRN
Status: DISCONTINUED | OUTPATIENT
Start: 2022-06-09 | End: 2022-06-11 | Stop reason: HOSPADM

## 2022-06-09 RX ORDER — POTASSIUM CHLORIDE 20 MEQ/1
40 TABLET, EXTENDED RELEASE ORAL ONCE
Status: DISCONTINUED | OUTPATIENT
Start: 2022-06-09 | End: 2022-06-09

## 2022-06-09 RX ADMIN — DEXMEDETOMIDINE HYDROCHLORIDE 0.1 MCG/KG/HR: 100 INJECTION, SOLUTION INTRAVENOUS at 00:03

## 2022-06-09 RX ADMIN — DEXTROSE MONOHYDRATE 125 ML: 100 INJECTION, SOLUTION INTRAVENOUS at 06:16

## 2022-06-09 RX ADMIN — POTASSIUM BICARBONATE 40 MEQ: 782 TABLET, EFFERVESCENT ORAL at 06:47

## 2022-06-09 RX ADMIN — DEXTROSE MONOHYDRATE 250 ML: 100 INJECTION, SOLUTION INTRAVENOUS at 09:54

## 2022-06-09 RX ADMIN — POTASSIUM CHLORIDE 10 MEQ: 10 INJECTION, SOLUTION INTRAVENOUS at 06:11

## 2022-06-09 RX ADMIN — SODIUM CHLORIDE, PRESERVATIVE FREE 10 ML: 5 INJECTION INTRAVENOUS at 20:28

## 2022-06-09 RX ADMIN — POTASSIUM CHLORIDE 20 MEQ: 1500 TABLET, EXTENDED RELEASE ORAL at 18:54

## 2022-06-09 RX ADMIN — SODIUM CHLORIDE: 9 INJECTION, SOLUTION INTRAVENOUS at 00:37

## 2022-06-09 RX ADMIN — POTASSIUM CHLORIDE 10 MEQ: 10 INJECTION, SOLUTION INTRAVENOUS at 11:04

## 2022-06-09 RX ADMIN — POTASSIUM CHLORIDE 10 MEQ: 10 INJECTION, SOLUTION INTRAVENOUS at 09:53

## 2022-06-09 RX ADMIN — DEXTROSE MONOHYDRATE 125 ML: 100 INJECTION, SOLUTION INTRAVENOUS at 12:34

## 2022-06-09 RX ADMIN — LEVETIRACETAM 1000 MG: 100 INJECTION, SOLUTION, CONCENTRATE INTRAVENOUS at 16:18

## 2022-06-09 RX ADMIN — SODIUM CHLORIDE, PRESERVATIVE FREE 10 ML: 5 INJECTION INTRAVENOUS at 09:55

## 2022-06-09 RX ADMIN — LEVETIRACETAM 1000 MG: 100 INJECTION, SOLUTION, CONCENTRATE INTRAVENOUS at 04:24

## 2022-06-09 RX ADMIN — ENOXAPARIN SODIUM 30 MG: 100 INJECTION SUBCUTANEOUS at 09:53

## 2022-06-09 RX ADMIN — GADOTERIDOL 11 ML: 279.3 INJECTION, SOLUTION INTRAVENOUS at 14:05

## 2022-06-09 RX ADMIN — POTASSIUM CHLORIDE 10 MEQ: 10 INJECTION, SOLUTION INTRAVENOUS at 08:10

## 2022-06-09 RX ADMIN — MIDAZOLAM 1 MG/HR: 5 INJECTION INTRAMUSCULAR; INTRAVENOUS at 00:09

## 2022-06-09 RX ADMIN — PROPOFOL 40 MCG/KG/MIN: 10 INJECTION, EMULSION INTRAVENOUS at 00:24

## 2022-06-09 ASSESSMENT — ENCOUNTER SYMPTOMS
RHINORRHEA: 0
CONSTIPATION: 0
ABDOMINAL DISTENTION: 0
COUGH: 0
SHORTNESS OF BREATH: 0
COLOR CHANGE: 0
WHEEZING: 0
VOMITING: 0
SORE THROAT: 0
DIARRHEA: 0
ABDOMINAL PAIN: 0
CHEST TIGHTNESS: 0
BLOOD IN STOOL: 0
NAUSEA: 0

## 2022-06-09 ASSESSMENT — PULMONARY FUNCTION TESTS
PIF_VALUE: 16
PIF_VALUE: 8
PIF_VALUE: 16
PIF_VALUE: 14
PIF_VALUE: 16
PIF_VALUE: 15
PIF_VALUE: 16
PIF_VALUE: 33
PIF_VALUE: 15
PIF_VALUE: 16
PIF_VALUE: 16
PIF_VALUE: 15
PIF_VALUE: 16
PIF_VALUE: 15
PIF_VALUE: 16
PIF_VALUE: 15

## 2022-06-09 ASSESSMENT — PAIN SCALES - GENERAL
PAINLEVEL_OUTOF10: 0

## 2022-06-09 NOTE — CONSULTS
NEUROLOGY / Ihsan Winter MD is requesting this consult. Reason for Consult: Seizures  Admission Chief Complaint: presented with several events concerning for seizures    History of Present Illness     Betina Enamorado is a 24 y.o. y/o female with hx of spells, kidney stones who presents with several episodes of thrashing arm & legs, was awake / able to follow commands in between events, ED physician concerned for seizure and patient was intubated & placed on sedation. She was transferred to [facility] for further monitoring. Patient was awake this morning, off sedation able to follow commands and nod appropriately to questions. She was extubated. Hx of spells. Hx take from boyfriend at bedside who lives with patient. He states that every month she will have stomach cramps, followed by nausea and vomiting, that then progresses to having several spells, the spells occur frequently over the following 7-10 days, the subside for about 2 weeks, then routine returns. This has been occurring for the last 11 months. They were seen once at [facility] and recommended follow up for EMU admission, however follow up instructions were not clear and patient did not schedule follow up. They present typically to [facility] ED if spells are longer or more difficult to control than typical. During these admissions she is typically treated with an abortive agent by EMS, but no AED's and typically discharged to home from ED. Post extubation patient had two events, with bilateral arm & leg thrashing, some kicking with R leg, shaking head back and forth. REVIEW OF SYSTEMS:   Constitutional- No weight loss or fevers   Eyes- No diplopia. No photophobia. Ears/nose/throat- No dysphagia. No Dysarthria   Cardiovascular- No palpitations. No chest pain   Respiratory- No dyspnea. No Cough   Gastrointestinal- +n/v  Genitourinary- No incontinence. No urinary retention   Musculoskeletal- No myalgia.  No arthralgia   Skin- No rash. No easy bruising. Psychiatric- No depression. No anxiety   Endocrine- No diabetes. No thyroid issues. Hematologic- No bleeding difficulty. No fatigue   Neurologic- current denies headaches    Past Medical, Surgical, Family, and Social History   PAST MEDICAL HISTORY:  Past Medical History:   Diagnosis Date    Hypokalemia     Kidney stone      SURGICAL HISTORY:  Past Surgical History:   Procedure Laterality Date    OTHER SURGICAL HISTORY  12/23/2021    EGD BIOPSY (N/A )    UPPER GASTROINTESTINAL ENDOSCOPY N/A 12/23/2021    EGD BIOPSY performed by Lesley Hinkle MD at [address] [place] [address] Rd:  Family history non-contributory  No family history on file. Social History     Tobacco Use    Smoking status: Current Every Day Smoker     Packs/day: 0.50     Types: Cigarettes    Smokeless tobacco: Never Used   Vaping Use    Vaping Use: Former    Substances: Nicotine, Flavoring    Devices: Disposable   Substance Use Topics    Alcohol use: No    Drug use: Yes     Frequency: 7.0 times per week     Types: Marijuana (Weed)          Allergies & Outpatient Medications   ALLERGIES:  Allergies   Allergen Reactions    Zoloft [Sertraline]      HOME MEDICATIONS:  Current Discharge Medication List      CONTINUE these medications which have NOT CHANGED    Details   ondansetron (ZOFRAN) 4 MG tablet Take 1 tablet by mouth 3 times daily as needed for Nausea or Vomiting  Qty: 15 tablet, Refills: 0      omeprazole (PRILOSEC) 40 MG delayed release capsule Take 1 capsule by mouth every morning (before breakfast)  Qty: 90 capsule, Refills: 1      LORazepam (ATIVAN) 1 MG tablet Take 1 mg by mouth every 6 hours as needed for Anxiety.       ondansetron (ZOFRAN ODT) 4 MG disintegrating tablet Take 1 tablet by mouth every 12 hours as needed for Nausea  Qty: 20 tablet, Refills: 0               Physical Exam   PHYSICAL EXAM:  /77   Pulse (!) 105   Temp 99.3 °F (37.4 °C) (Bladder)   Resp 21   Ht 5' 2\" (1.575 m)   Wt 111 lb 1.8 oz (50.4 kg)   SpO2 100%   BMI 20.32 kg/m²     General Alert, no distress, well-nourished    Exam post extubation  Neurologic  Mental status:   Orientation to person, place, time, situation  Attention intact as able to attend well to the exam    Language fluent in conversation  Comprehension intact; follows simple commands    Cranial nerves:   CN2: Visual fields full w/o extinction on confrontational testing  CN 3,4,6: Pupils equal and reactive to light, extraocular muscles intact  CN5: Facial sensation symmetric   CN7: Face symmetric  CN8: Hearing symmetric to spoken voice  CN9: Palate elevated symmetrically  CN11: Traps full strength on shoulder shrug  CN12: Tongue midline with protrusion    Motor Exam:  Generalized weakness, no focal findings        Sensory: light touch intact and symmetric in all 4 extremities. Tone: normal in all 4 extremities    Cardiovascular: Warm, appears well perfused   Respiratory: Easy, non-labored respiratory pattern   Abdominal: Abdomen is without distention   Extremities: Upper and lower extremities are atraumatic in appearance without deformity. Diagnostic Results   IMAGES:  Images personally reviewed and agree w/ radiology interpretation. Head CT  Impression       No acute intracranial abnormality noted.             LABS:  All results below personally reviewed. Pertinent positives & negatives are addressed in Impression & Recommendations below.      LABS   Metabolic Panel Recent Labs     06/08/22  1405 06/09/22  0508    143   K 3.4* 2.9*    109   CO2 16* 22   BUN 15 10   CREATININE 0.6 0.6   GLUCOSE 90 73   CALCIUM 9.1 8.4   LABALBU 4.8 4.0   MG 2.00 2.00   ALKPHOS 79 77   ALT 10 10   AST 12* 12*      CBC / Coags Recent Labs     06/08/22  1405 06/09/22  0508   WBC 8.4 12.2*   RBC 4.42 4.20   HGB 13.4 12.7   HCT 39.8 38.9    208      Other Lab Results   Component Value Date    COVID19 NEGATIVE 04/12/2022     Lab Results   Component Value Date    LACTA 0.9 06/09/2022          CURRENT SCHEDULED MEDICATIONS   Inpatient Medications   potassium chloride, 10 mEq, IntraVENous, Q1H    dextrose bolus, 250 mL, IntraVENous, Once    sodium chloride flush, 5-40 mL, IntraVENous, 2 times per day    enoxaparin, 30 mg, SubCUTAneous, Daily    levETIRAcetam, 1,000 mg, IntraVENous, Q12H   Infusions    propofol 30 mcg/kg/min (06/09/22 0600)    dextrose      midazolam Stopped (06/09/22 0333)    sodium chloride 5 mL/hr at 06/09/22 0600    dexmedetomidine (PRECEDEX) IV infusion Stopped (06/09/22 0232)      Antibiotics   Recent Abx Admin      No antibiotic orders with administrations found. 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 RECOMMENDATIONS:  No need for AED  Please place seizure precautions w/ padding side rails  As long as patient is safe no need to treat spells events with benzos  MRI of brain w/ & w/o   cvEEG for spell characterization   Discussed possibility of PNES with patient and boyfriend  May benefit from psych consult  Will need outpatient cognitive behavioral therapy   We will follow. Please call with questions.        NIKA Castro - CNP   Neurology & Neurocritical Care   Neurology Line: [phone]  PerfectServe: [facility] Neurology [facility] Neuro Critical [facility] NPs  6/9/2022 9:29 AM

## 2022-06-09 NOTE — ED NOTES
Pts temp 96.6. Notified Dr. Madonna Matthew. Warm blankets applied.      Dorian Dance, RN  06/08/22 2122

## 2022-06-09 NOTE — CONSULTS
[facility] Nurse  Consult Note       NAME:  Nora Irvin  MEDICAL RECORD NUMBER:  [id number]  AGE: 24 y.o. GENDER: female  : 2000  TODAY'S DATE:  2022    Subjective   Reason for WOCN Evaluation and Assessment: R Inner Thigh - unknown, possible skin tag removed      Marcos Leon is a 24 y.o. female referred by:   [] Physician  [x] Nursing  [] Other:     Wound Identification:  Wound Type: undetermined  Contributing Factors: skin tags    Wound History:  Patient is a 22-year-old female with a reported past medical history of previous seizure-like activity versus pseudoseizures. Patient reportedly has been seen by [facility] in the past with seizure-like activity however it does not appear that we have any records that she is seen a neurologist or undergo an EEG. Some emergency department notes specify the patient had seen no seizure-like activity and then left the emergency department AMA. Additionally, according to our controlled substance monitoring systems, the patient has received previous prescription for Ativan in the past, though is not currently on any antiepileptic medications. Per chart review, patient was seen today at [facility] given for seizure-like activities and was ultimately discharged, and represented to the [place] emergency department via EMS. Patient reportedly had more than 8 seizure episodes today but upon arrival to the emergency department the patient was alert and oriented by 3 and following commands. Patient then reportedly had multiple seizure-like activities in the emergency department and elevated lactic acid which prompted the emergency department to intubate the patient for concern of status epilepticus. Patient was given Versed without resolution of her jerking activities, which was followed by ketamine and additional Versed.   Patient's jerking movements remain refractory to these therapies, so patient was then also given breakfast) 90 capsule 1    LORazepam (ATIVAN) 1 MG tablet Take 1 mg by mouth every 6 hours as needed for Anxiety.  ondansetron (ZOFRAN ODT) 4 MG disintegrating tablet Take 1 tablet by mouth every 12 hours as needed for Nausea 20 tablet 0       Objective    BP (!) 143/92   Pulse 98   Temp 99.3 °F (37.4 °C) (Bladder)   Resp 20   Ht 5' 2\" (1.575 m)   Wt 111 lb 1.8 oz (50.4 kg)   SpO2 96%   BMI 20.32 kg/m²     LABS:  WBC:    Lab Results   Component Value Date    WBC 12.2 06/09/2022     H/H:    Lab Results   Component Value Date    HGB 12.7 06/09/2022    HCT 38.9 06/09/2022     PTT:  No results found for: APTT, PTT[APTT}  PT/INR:  No results found for: PROTIME, INR  HgBA1c:  No results found for: LABA1C    Assessment: R Inner Thigh - skin tag with scant bleeding, small open area next to it with drainage. Anup Risk Score: Anup Scale Score: 17    Patient Active Problem List   Diagnosis Code    Gastritis without bleeding K29.70    Status epilepticus ([facility] [address] G40.901    Seizure-like activity ([facility] [address] R56.9       Measurements:  Wound 06/09/22 Thigh Inner;Right;Proximal (Active)   Wound Image   06/09/22 0943   Wound Etiology Other 06/09/22 0943   Dressing Status New dressing applied 06/09/22 0943   Wound Cleansed Cleansed with saline 06/09/22 0943   Dressing/Treatment Alginate with Ag; Foam 06/09/22 0943   Dressing Change Due 06/10/22 06/09/22 0943   Wound Length (cm) 0.5 cm 06/09/22 0943   Wound Width (cm) 0.5 cm 06/09/22 0943   Wound Depth (cm) 0.1 cm 06/09/22 0943   Wound Surface Area (cm^2) 0.25 cm^2 06/09/22 0943   Wound Volume (cm^3) 0.025 cm^3 06/09/22 0943   Wound Assessment Pink/red 06/09/22 0943   Drainage Amount Scant 06/09/22 0943   Drainage Description Sanguinous 06/09/22 0943   Odor None 06/09/22 0943   Anila-wound Assessment Dry/flaky; Other (Comment) 06/09/22 4987   Margins Attached edges; Defined edges 06/09/22 0943   Wound Thickness Description not for Pressure Injury Partial thickness 06/09/22 2358   Number of days: 0   R Inner Thigh:      Response to treatment:  Well tolerated by patient.      Pain Assessment:  Severity:  0 / 10  Quality of pain: N/A  Wound Pain Timing/Severity: none  Premedicated: No    Plan   Plan of Care: Wound 06/09/22 Thigh Inner;Right;Proximal-Dressing/Treatment: Alginate with Ag,Foam   Recommendation: R Inner Thigh - clean with NS, small piece of alginate ag (Aquacel) - change daily, foam dressing  Call Wound Care for deterioration [phone]    Specialty Bed Required : No   [] Low Air Loss   [] Pressure Redistribution  [] Fluid Immersion  [] Bariatric  [] Total Pressure Relief  [] Other:     Current Diet: Diet NPO  Dietician consult:  No    Discharge Plan:  Placement for patient upon discharge: home with support    Patient appropriate for Outpatient [address]: No    Referrals:  [x]   [] [facility]  [] Supplies  [] Other    Patient/Caregiver Teaching:  Level of patient/caregiver understanding able to:   [] Indicates understanding       [] Needs reinforcement  [] Unsuccessful      [] Verbal Understanding  [] Demonstrated understanding       [] No evidence of learning  [] Refused teaching         [] N/A       Electronically signed by Sofya Nance RN, CWOCN on 6/9/2022 at 9:45 AM

## 2022-06-09 NOTE — H&P
ICU HISTORY AND PHYSICAL     Code: FULL  Admit Date: 6/8/2022    PCP: NIKA José CNP                                  CC: Refractory Seizure-like Activity     HISTORY OF PRESENT ILLNESS:    Patient is a 70-year-old female with a reported past medical history of previous seizure-like activity versus pseudoseizures. Patient reportedly has been seen by [facility] in the past with seizure-like activity however it does not appear that we have any records that she is seen a neurologist or undergo an EEG. Some emergency department notes specify the patient had seen no seizure-like activity and then left the emergency department AMA. Additionally, according to our controlled substance monitoring systems, the patient has received previous prescription for Ativan in the past, though is not currently on any antiepileptic medications. Per chart review, patient was seen today at [facility] given for seizure-like activities and was ultimately discharged, and represented to the [facility] emergency department via EMS. Patient reportedly had more than 8 seizure episodes today but upon arrival to the emergency department the patient was alert and oriented by 3 and following commands. Patient then reportedly had multiple seizure-like activities in the emergency department and elevated lactic acid which prompted the emergency department to intubate the patient for concern of status epilepticus. Patient was given Versed without resolution of her jerking activities, which was followed by ketamine and additional Versed. Patient's jerking movements remain refractory to these therapies, so patient was then also given propofol which ceased her jerking activities. It was then decided the patient would be transferred to [facility] for continuous EEG monitoring. Upon arrival, patient still receiving significant doses of propofol, Versed, and ketamine.   Patient will be admitted to the ICU here for neurological monitoring for the concern of status epilepticus as well as continuous EEG. Patient will be seen by neurology in the morning, and in the interim we will wean her current sedatives as appropriate. PAST HISTORY:     Past Medical History:   Diagnosis Date    Hypokalemia     Kidney stone      Past Surgical History:   Procedure Laterality Date    OTHER SURGICAL HISTORY  12/23/2021    EGD BIOPSY (N/A )    UPPER GASTROINTESTINAL ENDOSCOPY N/A 12/23/2021    EGD BIOPSY performed by Nilson Acevedo MD at [facility] 1:   Patient intubated and unable to give collateral history. Family History:  No family history on file. MEDICATIONS:     No current facility-administered medications on file prior to encounter. Current Outpatient Medications on File Prior to Encounter   Medication Sig Dispense Refill    ondansetron (ZOFRAN) 4 MG tablet Take 1 tablet by mouth 3 times daily as needed for Nausea or Vomiting 15 tablet 0    omeprazole (PRILOSEC) 40 MG delayed release capsule Take 1 capsule by mouth every morning (before breakfast) 90 capsule 1    LORazepam (ATIVAN) 1 MG tablet Take 1 mg by mouth every 6 hours as needed for Anxiety.  ondansetron (ZOFRAN ODT) 4 MG disintegrating tablet Take 1 tablet by mouth every 12 hours as needed for Nausea 20 tablet 0     Scheduled Meds:   sodium chloride flush  5-40 mL IntraVENous 2 times per day    enoxaparin  30 mg SubCUTAneous Daily    levETIRAcetam  1,000 mg IntraVENous Q12H      Continuous Infusions:   midazolam 1 mg/hr (06/09/22 0009)    propofol      sodium chloride      dexmedetomidine (PRECEDEX) IV infusion 0.1 mcg/kg/hr (06/09/22 0003)     PRN Meds:    Allergies:    Allergies   Allergen Reactions    Zoloft [Sertraline]      REVIEW OF SYSTEMS:         Review of Systems   Unable to perform ROS: Intubated     PHYSICAL EXAM:       Vitals: /72   Pulse 75   Temp (!) 95.7 °F (35.4 °C) (Bladder)   Resp 16   Ht 5' 2\" (1.575 m) Wt 111 lb 1.8 oz (50.4 kg)   SpO2 100%   BMI 20.32 kg/m²     I/O:  No intake or output data in the 24 hours ending 06/09/22 0009  No intake/output data recorded. No intake/output data recorded. Physical Examination:   Physical Exam  Constitutional:       Comments: Vicky Phillips white female currently heavily sedated laying in bed, intubated. HENT:      Head: Normocephalic and atraumatic. Right Ear: External ear normal.      Left Ear: External ear normal.      Nose: Nose normal.      Mouth/Throat:      Mouth: Mucous membranes are moist.      Pharynx: Oropharynx is clear. Comments: ETT in place  Eyes:      General: No scleral icterus. Conjunctiva/sclera: Conjunctivae normal.      Pupils: Pupils are equal, round, and reactive to light. Cardiovascular:      Rate and Rhythm: Normal rate and regular rhythm. Heart sounds: No murmur heard. No friction rub. No gallop. Pulmonary:      Effort: Pulmonary effort is normal. No respiratory distress. Breath sounds: No wheezing, rhonchi or rales. Comments: Mechanically ventilated, breath sounds noted bilaterally. Abdominal:      General: Abdomen is flat. There is no distension. Palpations: Abdomen is soft. Tenderness: There is no abdominal tenderness. There is no guarding or rebound. Musculoskeletal:         General: Signs of injury (several small mild bruises noted on b/l shins ) present. Right lower leg: No edema. Left lower leg: No edema. Skin:     General: Skin is warm and dry. Capillary Refill: Capillary refill takes less than 2 seconds. Neurological:      Comments: Patient heavily sedated and not currently following verbal commands.     Psychiatric:      Comments: Unable to assess         DATA:       Labs:  CBC:   Recent Labs     06/08/22  1405   WBC 8.4   HGB 13.4   HCT 39.8          BMP:   Recent Labs     06/08/22  1405      K 3.4*      CO2 16*   BUN 15   CREATININE 0.6   GLUCOSE 90 LFT's:   Recent Labs     06/08/22  1405   AST 12*   ALT 10   BILITOT 0.8   ALKPHOS 79     Troponin:   Recent Labs     06/08/22  1405   TROPONINI <0.01     BNP:No results for input(s): BNP in the last 72 hours. ABGs: No results for input(s): PHART, XSX7YSD, PO2ART in the last 72 hours. INR: No results for input(s): INR in the last 72 hours. U/A:  Recent Labs     06/08/22  1656   PHUR 5.0       No orders to display     ASSESSMENT AND PLAN:     Status Epilepticus  Questionable History of Prior Seizures vs Pseudoseizures   Pt reportedly had multiple seizure-like episodes at [facility] today and then again later which prompted her to be seen in the [facility] ED. Given her ongoing seizure-like episodes refractory to versed and her elevated lactic acid the decision was made to intubate the patient for concerns of status. CTH WNL, No leukocytosis or reported infectious symptoms at time of presentation. CXR w/o signs of aspiration or consolidation. Patient had previously been receiving ativan for a few months. -Keppra 1g BID IV, loaded w/ keppra at [facility]   -Pt currently on propofol gtt, precedex gtt, and versed gtt - will attempt to wean down to just propofol as tolerated   -Vent Mode: PRVC Resp Rate (Set): 14 bmp/Vt (Set, mL): 400 mL/PEEP5/FiO2 : 40 %  -LA of 8.0 at [facility], [address] [place] [address]   -Neurology c/s  -Attempted to call Next of Kin as designated in the chart to confirm patient's code status. Patient's mother, Sarah Trujillo, not available at this time.      Code Status: FULL (?)  FEN: NPO - Not yet on TFs   PPX: Lovenox  DISPO: ICU    This patient has been staffed and discussed with Lanie South MD.   -----------------------------  Alanna Baxter MD, PGY-1  6/9/2022  12:09 AM

## 2022-06-09 NOTE — PROGRESS NOTES
Patient has been successfully weaned from Mechanical Ventilation. Patient extubated and placed on 4 liters/min via nasal cannula. Post extubation SpO2 is 100% with HR  101 bpm and RR 14 breaths/min. Patient had mild cough that was non-productive. Extubation Well tolerated by patient. Adeel Holman

## 2022-06-09 NOTE — PROGRESS NOTES
4 Eyes Admission Assessment     I agree as the admission nurse that 2 RN's have performed a thorough Head to Toe Skin Assessment on the patient. ALL assessment sites listed below have been assessed on admission. Areas assessed by both nurses:     [x]   Head, Face, and Ears   [x]   Shoulders, Back, and Chest  [x]   Arms, Elbows, and Hands   [x]   Coccyx, Sacrum, and Ischium  [x]   Legs, Feet, and Heels        Does the Patient have Skin Breakdown? Yes a wound was noted on the Admission Assessment and an LDA was Initiated documentation include the Anila-wound, Wound Assessment, Measurements, Dressing Treatment, Drainage, and Color\",  (wound noted on R inner thigh/perineal area) otherwise, scattered bruising BUE and BLE.          Anup Prevention initiated:  Yes   Wound Care Orders initiated:  Yes      [address]  nurse consulted for Pressure Injury (Stage 3,4, Unstageable, DTI, NWPT, and Complex wounds) or Anup score 18 or lower:  Yes      Nurse 1 eSignature: Electronically signed by Susannah Reyes RN on 6/8/22 at 11:32 PM EDT    SHARE this note so that the co-signing nurse is able to place an eSignature    Nurse 2 eSignature: Electronically signed by Nakul Putnam RN on 6/9/22 at 1:50 AM EDT

## 2022-06-09 NOTE — ED NOTES
Report called to [address] [place] [address] with [employer].      Aisha Pate RN  06/08/22 7623

## 2022-06-09 NOTE — ED NOTES
Bedside report given to [facility] MICU. Pt left ED via stretcher in stable condition at this time on ventilator. Belongings taken home by pts Ki.       Ayla Valle RN  06/08/22 6612

## 2022-06-09 NOTE — PROGRESS NOTES
Patient was seen, examined and discussed with Dr. Trish Piedra. I agree with the history of present illness, past medical/surgical histories, family history, social history, medication list and allergies as listed. The review of systems is as noted above. My physical exam confirms the findings listed   Chart was reviewed including labs, CXR, CT scan and medical records confirm the findings noted     Shaking, unclear if it is seizure. During these episodes she is confused and has some visual disturbance. She was intubated last night due to concerns about airway protection. This morning she was following commands. RSBI was ~40. She was extubated. Had few episodes since. Main concern is her LA was 8.0 before intubation and trended quickly to normal after.       Pt was extubated   Discussed with neurology   Get EEG  Replace K

## 2022-06-09 NOTE — CONSULTS
ICU HISTORY AND PHYSICAL       Hospital Day: 2  ICU Day: 1                                                         Code:Full Code  Admit Date: 6/8/2022  PCP: NIKA Gordon CNP                                  CC: status epilepticus    HISTORY OF PRESENT ILLNESS:   Patient is a 80-year-old female with a reported past medical history of previous seizure-like activity versus pseudoseizures. Patient reportedly has been seen by [facility] in the past with seizure-like activity however it does not appear that we have any records that she is seen a neurologist or undergo an EEG. Some emergency department notes specify the patient had seen no seizure-like activity and then left the emergency department AMA. Additionally, according to our controlled substance monitoring systems, the patient has received previous prescription for Ativan in the past, though is not currently on any antiepileptic medications. Per chart review, patient was seen today at [facility] given for seizure-like activities and was ultimately discharged, and represented to the [facility] emergency department via EMS. Patient reportedly had more than 8 seizure episodes today but upon arrival to the emergency department the patient was alert and oriented by 3 and following commands. Patient then reportedly had multiple seizure-like activities in the emergency department and elevated lactic acid which prompted the emergency department to intubate the patient for concern of status epilepticus. Patient was given Versed without resolution of her jerking activities, which was followed by ketamine and additional Versed. Patient's jerking movements remain refractory to these therapies, so patient was then also given propofol which ceased her jerking activities. It was then decided the patient would be transferred to [facility] for continuous EEG monitoring.   Upon arrival, patient still receiving significant doses of propofol, Versed, and ketamine. Patient will be admitted to the ICU here for neurological monitoring for the concern of status epilepticus as well as continuous EEG. Patient will be seen by neurology in the morning, and in the interim we will wean her current sedatives as appropriate. PAST HISTORY:     Past Medical History:   Diagnosis Date    Hypokalemia     Kidney stone        Past Surgical History:   Procedure Laterality Date    OTHER SURGICAL HISTORY  12/23/2021    EGD BIOPSY (N/A )    UPPER GASTROINTESTINAL ENDOSCOPY N/A 12/23/2021    EGD BIOPSY performed by Renee Bazan MD at [address]:   The patient lives at home    Alcohol: previous abuse, currently rarely  Illicit drugs: no use  Tobacco: 1 pack year, current use    Family History:  No family history on file. MEDICATIONS:     No current facility-administered medications on file prior to encounter. Current Outpatient Medications on File Prior to Encounter   Medication Sig Dispense Refill    ondansetron (ZOFRAN) 4 MG tablet Take 1 tablet by mouth 3 times daily as needed for Nausea or Vomiting 15 tablet 0    omeprazole (PRILOSEC) 40 MG delayed release capsule Take 1 capsule by mouth every morning (before breakfast) 90 capsule 1    LORazepam (ATIVAN) 1 MG tablet Take 1 mg by mouth every 6 hours as needed for Anxiety.       ondansetron (ZOFRAN ODT) 4 MG disintegrating tablet Take 1 tablet by mouth every 12 hours as needed for Nausea 20 tablet 0         Scheduled Meds:   potassium chloride  10 mEq IntraVENous Q1H    dextrose  50 mL IntraVENous Once    sodium chloride flush  5-40 mL IntraVENous 2 times per day    enoxaparin  30 mg SubCUTAneous Daily    levETIRAcetam  1,000 mg IntraVENous Q12H      Continuous Infusions:   propofol 30 mcg/kg/min (06/09/22 0600)    dextrose      midazolam Stopped (06/09/22 0333)    sodium chloride 5 mL/hr at 06/09/22 0600    dexmedetomidine (PRECEDEX) IV infusion Stopped (06/09/22 0232)     PRN Meds:glucose, dextrose bolus **OR** dextrose bolus, glucagon (rDNA), dextrose, sodium chloride flush, sodium chloride, LORazepam, ondansetron **OR** ondansetron, polyethylene glycol, acetaminophen **OR** acetaminophen    Allergies: Allergies   Allergen Reactions    Zoloft [Sertraline]        REVIEW OF SYSTEMS:   History obtained from spouse and the patient    Review of Systems   Constitutional: Positive for fatigue. Negative for appetite change, chills and fever. HENT: Negative for congestion, hearing loss, rhinorrhea and sore throat. Eyes: Positive for visual disturbance (double vision, blurry vision). Respiratory: Negative for cough, chest tightness, shortness of breath and wheezing. Cardiovascular: Negative for chest pain, palpitations and leg swelling. Gastrointestinal: Negative for abdominal distention, abdominal pain, blood in stool, constipation, diarrhea, nausea and vomiting. Endocrine: Negative for polyuria. Genitourinary: Negative for difficulty urinating and dysuria. Musculoskeletal: Negative for arthralgias and myalgias. Skin: Negative for color change, pallor and rash. Neurological: Positive for tremors (Patient reports numbness of the fingers and toes and lips with this.). Negative for dizziness, seizures, syncope, facial asymmetry, weakness, light-headedness and headaches. Psychiatric/Behavioral: Positive for confusion. Negative for behavioral problems and hallucinations. PHYSICAL EXAM:   Vitals: /77   Pulse (!) 105   Temp 99.3 °F (37.4 °C) (Bladder)   Resp 21   Ht 5' 2\" (1.575 m)   Wt 111 lb 1.8 oz (50.4 kg)   SpO2 100%   BMI 20.32 kg/m²     I/O:      Intake/Output Summary (Last 24 hours) at 6/9/2022 0821  Last data filed at 6/9/2022 0600  Gross per 24 hour   Intake 164.48 ml   Output 520 ml   Net -355.52 ml     No intake/output data recorded. I/O last 3 completed shifts:   In: 164.5 [I.V.:69.9; IV Piggyback:94.6]  Out: 520 [Urine:520]    Physical Examination:     Physical Exam  Constitutional:       General: She is not in acute distress. Appearance: Normal appearance. She is not ill-appearing, toxic-appearing or diaphoretic. HENT:      Head: Normocephalic and atraumatic. Right Ear: External ear normal.      Left Ear: External ear normal.      Nose: Nose normal. No congestion or rhinorrhea. Mouth/Throat:      Mouth: Mucous membranes are moist.      Pharynx: Oropharynx is clear. Eyes:      Extraocular Movements: Extraocular movements intact. Conjunctiva/sclera: Conjunctivae normal.      Pupils: Pupils are equal, round, and reactive to light. Cardiovascular:      Rate and Rhythm: Normal rate and regular rhythm. Pulses: Normal pulses. Heart sounds: Normal heart sounds. No murmur heard. No friction rub. No gallop. Pulmonary:      Effort: Pulmonary effort is normal. No respiratory distress. Breath sounds: Normal breath sounds. No wheezing or rales. Abdominal:      General: Abdomen is flat. Bowel sounds are normal. There is no distension. Palpations: Abdomen is soft. There is no mass. Tenderness: There is no abdominal tenderness. There is no guarding or rebound. Hernia: No hernia is present. Musculoskeletal:         General: No swelling, tenderness, deformity or signs of injury. Normal range of motion. Cervical back: Normal range of motion and neck supple. Right lower leg: No edema. Left lower leg: No edema. Skin:     General: Skin is warm and dry. Capillary Refill: Capillary refill takes less than 2 seconds. Coloration: Skin is not jaundiced or pale. Neurological:      Mental Status: She is alert. She is disoriented. Cranial Nerves: No cranial nerve deficit. Comments: Oriented to self and situation, disoriented to place and time. Tremor is noted intermittently which starts in the extremities and evolves to include the hip flexors/extensors.     Psychiatric: Thought Content: Thought content normal.      Comments: Patient is tearfully concerned with mortality. Access:   -Central Access Day #: none                                  -Peripheral Access Day#: 1  -Arterial line Day#: none                                  Wheeler Day#:1  NGT Day#: none                                            ETT Day#:extubated this AM  Vent Settings: extubated this morning        DATA:   Labs:  CBC:   Recent Labs     06/08/22  1405 06/09/22  0508   WBC 8.4 12.2*   HGB 13.4 12.7   HCT 39.8 38.9    208       BMP:   Recent Labs     06/08/22  1405 06/09/22  0508    143   K 3.4* 2.9*    109   CO2 16* 22   BUN 15 10   CREATININE 0.6 0.6   GLUCOSE 90 73     LFT's:   Recent Labs     06/08/22  1405 06/09/22  0508   AST 12* 12*   ALT 10 10   BILITOT 0.8 0.9   ALKPHOS 79 77     Troponin:   Recent Labs     06/08/22  1405   TROPONINI <0.01     U/A:  Recent Labs     06/08/22  1656   PHUR 5.0     RADIOLOGY & IMAGING:    No orders to display       EKG: Normal sinus rhythm Poor R wave progression could be due to lead placement    Echo: none on file    Micro: UCx with GBS on 9/3/15. ASSESSMENT AND PLAN:   Status Epilepticus  Questionable History of Prior Seizures vs Pseudoseizures   Pt reportedly had multiple seizure-like episodes at [facility] today and then again later which prompted her to be seen in the [facility] ED. Given her ongoing seizure-like episodes refractory to versed and her elevated lactic acid the decision was made to intubate the patient for concerns of status. CTH WNL, No leukocytosis or reported infectious symptoms at time of presentation. CXR w/o signs of aspiration or consolidation. Patient had previously been receiving ativan for a few months.   - Keppra 1g BID IV, loaded w/ keppra at [address] this morning  - LA of 8.0 at [facility], trended down to 0.9  - Neurology c/s  - Stat EEG  - Patient's mother, Abhilash Mccracken, not available at this time.  Spoke with pt's adilene present at bedside     Code Status: FULL code for now  FEN: NPO  PPX: Lovenox  DISPO: ICU    This patient has been staffed and discussed with Dr. Zaida Armas MD.   -----------------------------  Ana Quintero MD, PGY-1  6/9/2022  8:21 AM

## 2022-06-09 NOTE — PROGRESS NOTES
CONTINUOUS EEG    Name:  [facility] Record Number:  [id number]  Age: 24 y.o. Gender: female  : 2000  Today's Date:  2022  Room:  [facility]4873-29  Vital Signs   /74   Pulse 82   Temp 99.1 °F (37.3 °C) (Bladder)   Resp 26   Ht 5' 2\" (1.575 m)   Wt 111 lb 1.8 oz (50.4 kg)   SpO2 95%   BMI 20.32 kg/m²           Continuous EEG Testing Start Time:      Continuous EEG Testing End Time:       Comments: Tj [employer] contacted  to begin monitoring. Impedence on all leads are within normal limits. Plan of Care: Begin monitoring.     Electronically signed by Bobby Lion on 2022 at 6:14 PM

## 2022-06-09 NOTE — CONSULTS
Clinical Pharmacy Progress Note  Medication History    Admit Date: 6/8/2022  Consulting Provider: Dr. Brayan Sherman     List of current medications patient is taking is complete. Home medication list in EPIC updated to reflect changes noted below. Source of information: patient and outpatient fill history    Changes made to medication list:   Medications removed:  · Lorazepam (last filled December 2021 for a 30 day supply)  · Omeprazole  · Ondansetron    Medications added:   · Escitalopram 10 mg daily    Please call with any questions.     Clary Willard, PharmD, [facility]  Wireless: [phone]  6/9/2022 11:22 AM

## 2022-06-09 NOTE — PLAN OF CARE
Problem: Discharge Planning  Goal: Discharge to home or other facility with appropriate resources  Outcome: Progressing     Problem: Safety - Medical Restraint  Goal: Remains free of injury from restraints (Restraint for Interference with Medical Device)  Description: INTERVENTIONS:  1. Determine that other, less restrictive measures have been tried or would not be effective before applying the restraint  2. Evaluate the patient's condition at the time of restraint application  3. Inform patient/family regarding the reason for restraint  4. Q2H: Monitor safety, psychosocial status, comfort, nutrition and hydration  Outcome: Progressing  Flowsheets (Taken 6/9/2022 0100)  Remains free of injury from restraints (restraint for interference with medical device):   Evaluate the patient's condition at the time of restraint application   Inform patient/family regarding the reason for restraint   Every 2 hours: Monitor safety, psychosocial status, comfort, nutrition and hydration     Problem: Pain  Goal: Verbalizes/displays adequate comfort level or baseline comfort level  Outcome: Progressing     Problem: Skin/Tissue Integrity  Goal: Absence of new skin breakdown  Description: 1. Monitor for areas of redness and/or skin breakdown  2. Assess vascular access sites hourly  3. Every 4-6 hours minimum:  Change oxygen saturation probe site  4. Every 4-6 hours:  If on nasal continuous positive airway pressure, respiratory therapy assess nares and determine need for appliance change or resting period.   Outcome: Progressing     Problem: Safety - Adult  Goal: Free from fall injury  Outcome: Progressing     Problem: ABCDS Injury Assessment  Goal: Absence of physical injury  Outcome: Progressing

## 2022-06-09 NOTE — PROCEDURES
Continuous EEG monitoring record    Patient name: Primitivo Renteria    START:  06/09/2022 @ 17:42pm  END: 06/10/2022 @ 08:00am       Electroencephalographer: Stevenson Peters MD PhD      CLINICAL DETAILS:  This EEG was performed on this 24 y. o.yo female admitted for Altered mental Status and concer for subclinical seizures      TECHNICAL DETAILS:  Continuous video-EEG monitoring was performed with 27 surface scalp electrodes placed according to the International 10-20 electrode placement system, using a 32-channel [employer] headbox. All EEG and video information was acquired digitally, including the use of automated spike and seizure detection software to detect epileptiform activity. An event button was also available to be depressed during clinical events. 06/10/2022  00:00am to 08:00am    SEIZURES:  None  INTERICTAL:  None  PUSHBUTTONS:  None  BACKGROUND:  Excessive beta frequencies during waking, which consits of mixed posterior dominant alpha and beta frequencies. Symmetric drowsiness and sleep are seen  EKG:  regular    06/09/2022  17:42pm - 23:59pm   SEIZURES:  None  INTERICTAL:  None  PUSHBUTTONS:  None  BACKGROUND:  Excessive beta frequencies during waking  Focal theta seen in the T5 region. EKG:  regular    CLINICAL INTERPRETATION:  This is an abnormal EEG for age and state due to the presence of excessive beta frequencies that often obscure the background as well as focal theta slowing seen in the left temporal region, suggesting an underlying focal defect. As the record progresses, the background normalizes. No epileptiform discharges or seizures were seen . Clinical correlation is advised.         Stevenson Peters MD PhD

## 2022-06-09 NOTE — PLAN OF CARE
Problem: Safety - Medical Restraint  Goal: Remains free of injury from restraints (Restraint for Interference with Medical Device)  Description: INTERVENTIONS:  1. Determine that other, less restrictive measures have been tried or would not be effective before applying the restraint  2. Evaluate the patient's condition at the time of restraint application  3. Inform patient/family regarding the reason for restraint  4.  Q2H: Monitor safety, psychosocial status, comfort, nutrition and hydration  6/9/2022 1859 by Mary Cardona RN  Outcome: Completed  6/9/2022 1858 by Mary Cardona RN  Outcome: Progressing

## 2022-06-09 NOTE — PROGRESS NOTES
Pt's sedation was turned down, pt was agitated and restless in bed, pt did start to follow commands. Neurology called to see pt okay to extubated from there stand point. Will continue to monitor.

## 2022-06-09 NOTE — PLAN OF CARE
Problem: Discharge Planning  Goal: Discharge to home or other facility with appropriate resources  Outcome: Progressing  Flowsheets  Taken 6/9/2022 1158  Discharge to home or other facility with appropriate resources:   Identify barriers to discharge with patient and caregiver   Identify discharge learning needs (meds, wound care, etc)   Refer to discharge planning if patient needs post-hospital services based on physician order or complex needs related to functional status, cognitive ability or social support system   Arrange for needed discharge resources and transportation as appropriate  Taken 6/9/2022 0800  Discharge to home or other facility with appropriate resources:   Identify barriers to discharge with patient and caregiver   Arrange for needed discharge resources and transportation as appropriate   Identify discharge learning needs (meds, wound care, etc)   Refer to discharge planning if patient needs post-hospital services based on physician order or complex needs related to functional status, cognitive ability or social support system     Problem: Pain  Goal: Verbalizes/displays adequate comfort level or baseline comfort level  Outcome: Progressing     Problem: Skin/Tissue Integrity  Goal: Absence of new skin breakdown  Description: 1. Monitor for areas of redness and/or skin breakdown  2. Assess vascular access sites hourly  3. Every 4-6 hours minimum:  Change oxygen saturation probe site  4. Every 4-6 hours:  If on nasal continuous positive airway pressure, respiratory therapy assess nares and determine need for appliance change or resting period.   Outcome: Progressing     Problem: Safety - Adult  Goal: Free from fall injury  Outcome: Progressing  Flowsheets (Taken 6/9/2022 0938)  Free From Fall Injury:   Instruct family/caregiver on patient safety   Based on caregiver fall risk screen, instruct family/caregiver to ask for assistance with transferring infant if caregiver noted to have fall risk factors     Problem: ABCDS Injury Assessment  Goal: Absence of physical injury  Outcome: Progressing  Flowsheets (Taken 6/9/2022 9892)  Absence of Physical Injury: Implement safety measures based on patient assessment

## 2022-06-09 NOTE — PROGRESS NOTES
Patient being managed by icu team, non billable encounter, admitted overnight, chart reviewed. Mri pending, neurology following.

## 2022-06-10 LAB
ALBUMIN SERPL-MCNC: 4.3 G/DL (ref 3.4–5)
AMORPHOUS: ABNORMAL /HPF
ANION GAP SERPL CALCULATED.3IONS-SCNC: 21 MMOL/L (ref 3–16)
BASOPHILS ABSOLUTE: 0 K/UL (ref 0–0.2)
BASOPHILS ABSOLUTE: 0.1 K/UL (ref 0–0.2)
BASOPHILS RELATIVE PERCENT: 0.5 %
BASOPHILS RELATIVE PERCENT: 0.9 %
BILIRUBIN URINE: ABNORMAL
BLOOD, URINE: ABNORMAL
BUN BLDV-MCNC: 10 MG/DL (ref 7–20)
CALCIUM SERPL-MCNC: 9.1 MG/DL (ref 8.3–10.6)
CHLORIDE BLD-SCNC: 99 MMOL/L (ref 99–110)
CLARITY: CLEAR
CO2: 16 MMOL/L (ref 21–32)
COLOR: YELLOW
CREAT SERPL-MCNC: 0.8 MG/DL (ref 0.6–1.1)
CRYSTALS, UA: ABNORMAL /HPF
EOSINOPHILS ABSOLUTE: 0 K/UL (ref 0–0.6)
EOSINOPHILS ABSOLUTE: 0 K/UL (ref 0–0.6)
EOSINOPHILS RELATIVE PERCENT: 0.5 %
EOSINOPHILS RELATIVE PERCENT: 0.5 %
EPITHELIAL CELLS, UA: ABNORMAL /HPF (ref 0–5)
GFR AFRICAN AMERICAN: >60
GFR NON-AFRICAN AMERICAN: >60
GLUCOSE BLD-MCNC: 111 MG/DL (ref 70–99)
GLUCOSE URINE: NEGATIVE MG/DL
HCT VFR BLD CALC: 35.6 % (ref 36–48)
HCT VFR BLD CALC: 39 % (ref 36–48)
HEMOGLOBIN: 11.7 G/DL (ref 12–16)
HEMOGLOBIN: 13.3 G/DL (ref 12–16)
INR BLD: 1.14 (ref 0.87–1.14)
KETONES, URINE: 15 MG/DL
LACTIC ACID: 8.7 MMOL/L (ref 0.4–2)
LEUKOCYTE ESTERASE, URINE: NEGATIVE
LYMPHOCYTES ABSOLUTE: 2.1 K/UL (ref 1–5.1)
LYMPHOCYTES ABSOLUTE: 2.4 K/UL (ref 1–5.1)
LYMPHOCYTES RELATIVE PERCENT: 28.2 %
LYMPHOCYTES RELATIVE PERCENT: 33.2 %
MCH RBC QN AUTO: 30.3 PG (ref 26–34)
MCH RBC QN AUTO: 31.1 PG (ref 26–34)
MCHC RBC AUTO-ENTMCNC: 32.9 G/DL (ref 31–36)
MCHC RBC AUTO-ENTMCNC: 34.2 G/DL (ref 31–36)
MCV RBC AUTO: 91.1 FL (ref 80–100)
MCV RBC AUTO: 92.2 FL (ref 80–100)
MICROSCOPIC EXAMINATION: YES
MONOCYTES ABSOLUTE: 0.5 K/UL (ref 0–1.3)
MONOCYTES ABSOLUTE: 0.8 K/UL (ref 0–1.3)
MONOCYTES RELATIVE PERCENT: 8.3 %
MONOCYTES RELATIVE PERCENT: 9.6 %
MRSA SCREEN RT-PCR: NORMAL
MUCUS: ABNORMAL /LPF
NEUTROPHILS ABSOLUTE: 3.6 K/UL (ref 1.7–7.7)
NEUTROPHILS ABSOLUTE: 5.3 K/UL (ref 1.7–7.7)
NEUTROPHILS RELATIVE PERCENT: 57.5 %
NEUTROPHILS RELATIVE PERCENT: 60.8 %
NITRITE, URINE: NEGATIVE
PDW BLD-RTO: 13.5 % (ref 12.4–15.4)
PDW BLD-RTO: 14 % (ref 12.4–15.4)
PH UA: 6.5 (ref 5–8)
PHOSPHORUS: 3.4 MG/DL (ref 2.5–4.9)
PLATELET # BLD: 190 K/UL (ref 135–450)
PLATELET # BLD: 244 K/UL (ref 135–450)
PMV BLD AUTO: 8.4 FL (ref 5–10.5)
PMV BLD AUTO: 8.7 FL (ref 5–10.5)
POTASSIUM SERPL-SCNC: 3.2 MMOL/L (ref 3.5–5.1)
PROTEIN UA: NEGATIVE MG/DL
PROTHROMBIN TIME: 14.5 SEC (ref 11.7–14.5)
RBC # BLD: 3.87 M/UL (ref 4–5.2)
RBC # BLD: 4.29 M/UL (ref 4–5.2)
RBC UA: ABNORMAL /HPF (ref 0–4)
RENAL EPITHELIAL, UA: ABNORMAL /HPF (ref 0–1)
SODIUM BLD-SCNC: 136 MMOL/L (ref 136–145)
SPECIFIC GRAVITY UA: 1.02 (ref 1–1.03)
TOTAL CK: 80 U/L (ref 26–192)
URINE REFLEX TO CULTURE: ABNORMAL
URINE TYPE: ABNORMAL
UROBILINOGEN, URINE: 2 E.U./DL
WBC # BLD: 6.2 K/UL (ref 4–11)
WBC # BLD: 8.7 K/UL (ref 4–11)
WBC UA: ABNORMAL /HPF (ref 0–5)

## 2022-06-10 PROCEDURE — 2580000003 HC RX 258: Performed by: INTERNAL MEDICINE

## 2022-06-10 PROCEDURE — 95813 EEG EXTND MNTR 61-119 MIN: CPT

## 2022-06-10 PROCEDURE — 84443 ASSAY THYROID STIM HORMONE: CPT

## 2022-06-10 PROCEDURE — 85610 PROTHROMBIN TIME: CPT

## 2022-06-10 PROCEDURE — 6370000000 HC RX 637 (ALT 250 FOR IP): Performed by: INTERNAL MEDICINE

## 2022-06-10 PROCEDURE — 81001 URINALYSIS AUTO W/SCOPE: CPT

## 2022-06-10 PROCEDURE — 84146 ASSAY OF PROLACTIN: CPT

## 2022-06-10 PROCEDURE — 00JU3ZZ INSPECTION OF SPINAL CANAL, PERCUTANEOUS APPROACH: ICD-10-PCS | Performed by: INTERNAL MEDICINE

## 2022-06-10 PROCEDURE — 6360000002 HC RX W HCPCS: Performed by: INTERNAL MEDICINE

## 2022-06-10 PROCEDURE — 36415 COLL VENOUS BLD VENIPUNCTURE: CPT

## 2022-06-10 PROCEDURE — 85025 COMPLETE CBC W/AUTO DIFF WBC: CPT

## 2022-06-10 PROCEDURE — 82550 ASSAY OF CK (CPK): CPT

## 2022-06-10 PROCEDURE — 80069 RENAL FUNCTION PANEL: CPT

## 2022-06-10 PROCEDURE — 83605 ASSAY OF LACTIC ACID: CPT

## 2022-06-10 PROCEDURE — 1200000000 HC SEMI PRIVATE

## 2022-06-10 PROCEDURE — 99291 CRITICAL CARE FIRST HOUR: CPT | Performed by: NURSE PRACTITIONER

## 2022-06-10 RX ORDER — POTASSIUM CHLORIDE 20 MEQ/1
40 TABLET, EXTENDED RELEASE ORAL 2 TIMES DAILY WITH MEALS
Status: COMPLETED | OUTPATIENT
Start: 2022-06-10 | End: 2022-06-10

## 2022-06-10 RX ORDER — LORAZEPAM 2 MG/ML
2 INJECTION INTRAMUSCULAR ONCE
Status: COMPLETED | OUTPATIENT
Start: 2022-06-10 | End: 2022-06-10

## 2022-06-10 RX ORDER — ESCITALOPRAM OXALATE 10 MG/1
10 TABLET ORAL DAILY
Status: DISCONTINUED | OUTPATIENT
Start: 2022-06-10 | End: 2022-06-11 | Stop reason: HOSPADM

## 2022-06-10 RX ADMIN — ESCITALOPRAM OXALATE 10 MG: 10 TABLET ORAL at 21:07

## 2022-06-10 RX ADMIN — LORAZEPAM 1 MG: 2 INJECTION INTRAMUSCULAR; INTRAVENOUS at 10:36

## 2022-06-10 RX ADMIN — LORAZEPAM 1 MG: 2 INJECTION INTRAMUSCULAR; INTRAVENOUS at 20:00

## 2022-06-10 RX ADMIN — SODIUM CHLORIDE, PRESERVATIVE FREE 10 ML: 5 INJECTION INTRAVENOUS at 08:30

## 2022-06-10 RX ADMIN — ENOXAPARIN SODIUM 30 MG: 100 INJECTION SUBCUTANEOUS at 08:29

## 2022-06-10 RX ADMIN — POTASSIUM CHLORIDE 20 MEQ: 1500 TABLET, EXTENDED RELEASE ORAL at 08:29

## 2022-06-10 RX ADMIN — LORAZEPAM 1 MG: 2 INJECTION INTRAMUSCULAR; INTRAVENOUS at 19:55

## 2022-06-10 RX ADMIN — POTASSIUM CHLORIDE 40 MEQ: 1500 TABLET, EXTENDED RELEASE ORAL at 16:34

## 2022-06-10 RX ADMIN — SODIUM CHLORIDE, PRESERVATIVE FREE 10 ML: 5 INJECTION INTRAVENOUS at 21:07

## 2022-06-10 RX ADMIN — LEVETIRACETAM 1000 MG: 100 INJECTION, SOLUTION, CONCENTRATE INTRAVENOUS at 04:27

## 2022-06-10 RX ADMIN — LORAZEPAM 2 MG: 2 INJECTION INTRAMUSCULAR; INTRAVENOUS at 20:05

## 2022-06-10 RX ADMIN — LORAZEPAM 1 MG: 2 INJECTION INTRAMUSCULAR; INTRAVENOUS at 10:31

## 2022-06-10 RX ADMIN — POTASSIUM BICARBONATE 40 MEQ: 782 TABLET, EFFERVESCENT ORAL at 21:51

## 2022-06-10 RX ADMIN — LEVETIRACETAM 2000 MG: 100 INJECTION, SOLUTION, CONCENTRATE INTRAVENOUS at 20:00

## 2022-06-10 ASSESSMENT — PAIN SCALES - GENERAL
PAINLEVEL_OUTOF10: 0

## 2022-06-10 NOTE — PLAN OF CARE
Problem: Pain  Goal: Verbalizes/displays adequate comfort level or baseline comfort level  Outcome: Progressing     Problem: Skin/Tissue Integrity  Goal: Absence of new skin breakdown  Description: 1. Monitor for areas of redness and/or skin breakdown  2. Assess vascular access sites hourly  3. Every 4-6 hours minimum:  Change oxygen saturation probe site  4. Every 4-6 hours:  If on nasal continuous positive airway pressure, respiratory therapy assess nares and determine need for appliance change or resting period.   Outcome: Progressing     Problem: Safety - Adult  Goal: Free from fall injury  Outcome: Progressing  Flowsheets (Taken 6/10/2022 1112)  Free From Fall Injury: Instruct family/caregiver on patient safety

## 2022-06-10 NOTE — PROGRESS NOTES
Progress Note  Admit Date: 6/8/2022       Pt seen and examined on continuous eeg  Follows commands in  No distress  Scheduled Medications:    potassium chloride  20 mEq Oral BID WC    sodium chloride flush  5-40 mL IntraVENous 2 times per day    [Held by provider] enoxaparin  30 mg SubCUTAneous Daily      PRN Medications: glucose, dextrose bolus **OR** dextrose bolus, glucagon (rDNA), dextrose, sodium chloride flush, sodium chloride, LORazepam, ondansetron **OR** ondansetron, polyethylene glycol, acetaminophen **OR** acetaminophen  Diet: ADULT DIET;  Regular    Continuous Infusions:   propofol Stopped (06/09/22 0850)    dextrose      midazolam Stopped (06/09/22 0333)    sodium chloride 5 mL/hr at 06/09/22 0600    dexmedetomidine (PRECEDEX) IV infusion Stopped (06/09/22 0232)       PHYSICAL EXAM:  BP (!) 105/53   Pulse (!) 109   Temp 97.8 °F (36.6 °C) (Oral)   Resp 28   Ht 5' 2\" (1.575 m)   Wt 115 lb 8.3 oz (52.4 kg)   SpO2 97%   BMI 21.13 kg/m²   Recent Labs     06/09/22  0642 06/09/22  0753 06/09/22  1230 06/09/22  1306 06/09/22  1628   POCGLU 104* 70 57* 109* 80       Intake/Output Summary (Last 24 hours) at 6/10/2022 1600  Last data filed at 6/10/2022 0500  Gross per 24 hour   Intake 100 ml   Output 475 ml   Net -375 ml       BP (!) 105/53   Pulse (!) 109   Temp 97.8 °F (36.6 °C) (Oral)   Resp 28   Ht 5' 2\" (1.575 m)   Wt 115 lb 8.3 oz (52.4 kg)   SpO2 97%   BMI 21.13 kg/m²   General appearance: alert, appears stated age and cooperative  Head: Normocephalic, without obvious abnormality, atraumatic  Neck: no adenopathy, no carotid bruit, no JVD, supple, symmetrical, trachea midline and thyroid not enlarged, symmetric, no tenderness/mass/nodules  Lungs: clear to auscultation bilaterally  Heart: regular rate and rhythm, S1, S2 normal, no murmur, click, rub or gallop  Abdomen: soft, non-tender; bowel sounds normal; no masses,  no organomegaly  Extremities: extremities normal, atraumatic, no cyanosis or edema  Pulses: 2+ and symmetric  Skin: Skin color, texture, turgor normal. No rashes or lesions    LABS:  Recent Labs     06/08/22  1405 06/09/22  0508 06/10/22  0437   WBC 8.4 12.2* 6.2   HGB 13.4 12.7 11.7*   HCT 39.8 38.9 35.6*    208 190                                                                    Recent Labs     06/08/22  1405 06/09/22  0508 06/09/22  1612    143  --    K 3.4* 2.9* 3.2*    109  --    CO2 16* 22  --    BUN 15 10  --    CREATININE 0.6 0.6  --    GLUCOSE 90 73  --      Recent Labs     06/08/22  1405 06/09/22  0508   AST 12* 12*   ALT 10 10   BILITOT 0.8 0.9   ALKPHOS 79 77     Recent Labs     06/08/22  1405   TROPONINI <0.01     No results for input(s): BNP in the last 72 hours. No results for input(s): CHOL, HDL in the last 72 hours. Invalid input(s): LDLCALCU  Recent Labs     06/10/22  1400   INR 1.14       Assessment & Plan:    Patient Active Problem List:     Gastritis without bleeding     Status epilepticus ([address]     Seizure-like activity ([facility] [address]      23 yo female admitted with seizure lactic acidosis and abnormal foci on mri. Patient will undergo lp, discussed case with neurology - patient may need a dedicated epilepsy unit, monitor closely on continuous eeg, await lp    The patient and / or the family were informed of the results of any tests, a time was given to answer questions, a plan was proposed and they agreed with plan.   Disposition: continue inpatient stay   Full MD Yanely Ludwig

## 2022-06-10 NOTE — PROGRESS NOTES
Patient had several episodes of bilateral arm and leg thrashing lasting approximately 5 minutes. Event monitor button pressed at time of event. PRN Ativan given. Patient alert between episodes, reports blurred vision during episodes. Patient alert and oriented and following commands appropriately and no neurological deficits noted after event subsided. Jhonny Levy neurology NP notified and is aware.
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
## 2022-06-11 VITALS
HEART RATE: 81 BPM | DIASTOLIC BLOOD PRESSURE: 75 MMHG | WEIGHT: 119.27 LBS | SYSTOLIC BLOOD PRESSURE: 108 MMHG | TEMPERATURE: 97.5 F | RESPIRATION RATE: 18 BRPM | OXYGEN SATURATION: 99 % | BODY MASS INDEX: 21.95 KG/M2 | HEIGHT: 62 IN

## 2022-06-11 LAB
ALBUMIN SERPL-MCNC: 4.1 G/DL (ref 3.4–5)
ANION GAP SERPL CALCULATED.3IONS-SCNC: 11 MMOL/L (ref 3–16)
BASOPHILS ABSOLUTE: 0 K/UL (ref 0–0.2)
BASOPHILS RELATIVE PERCENT: 0.6 %
BUN BLDV-MCNC: 12 MG/DL (ref 7–20)
CALCIUM SERPL-MCNC: 9.2 MG/DL (ref 8.3–10.6)
CHLORIDE BLD-SCNC: 104 MMOL/L (ref 99–110)
CO2: 21 MMOL/L (ref 21–32)
CREAT SERPL-MCNC: 0.5 MG/DL (ref 0.6–1.1)
EOSINOPHILS ABSOLUTE: 0.1 K/UL (ref 0–0.6)
EOSINOPHILS RELATIVE PERCENT: 1 %
GFR AFRICAN AMERICAN: >60
GFR NON-AFRICAN AMERICAN: >60
GLUCOSE BLD-MCNC: 100 MG/DL (ref 70–99)
HCT VFR BLD CALC: 38.3 % (ref 36–48)
HEMOGLOBIN: 13.1 G/DL (ref 12–16)
LACTIC ACID: 0.6 MMOL/L (ref 0.4–2)
LYMPHOCYTES ABSOLUTE: 2.2 K/UL (ref 1–5.1)
LYMPHOCYTES RELATIVE PERCENT: 33.5 %
MCH RBC QN AUTO: 30.9 PG (ref 26–34)
MCHC RBC AUTO-ENTMCNC: 34.1 G/DL (ref 31–36)
MCV RBC AUTO: 90.5 FL (ref 80–100)
MONOCYTES ABSOLUTE: 0.6 K/UL (ref 0–1.3)
MONOCYTES RELATIVE PERCENT: 9.2 %
NEUTROPHILS ABSOLUTE: 3.6 K/UL (ref 1.7–7.7)
NEUTROPHILS RELATIVE PERCENT: 55.7 %
PDW BLD-RTO: 13.8 % (ref 12.4–15.4)
PHOSPHORUS: 3.1 MG/DL (ref 2.5–4.9)
PLATELET # BLD: 226 K/UL (ref 135–450)
PMV BLD AUTO: 8.3 FL (ref 5–10.5)
POTASSIUM SERPL-SCNC: 3.9 MMOL/L (ref 3.5–5.1)
PROLACTIN: 37.6 NG/ML
RBC # BLD: 4.24 M/UL (ref 4–5.2)
SODIUM BLD-SCNC: 136 MMOL/L (ref 136–145)
TSH REFLEX: 1.67 UIU/ML (ref 0.27–4.2)
WBC # BLD: 6.5 K/UL (ref 4–11)

## 2022-06-11 PROCEDURE — 6370000000 HC RX 637 (ALT 250 FOR IP): Performed by: INTERNAL MEDICINE

## 2022-06-11 PROCEDURE — 80069 RENAL FUNCTION PANEL: CPT

## 2022-06-11 PROCEDURE — 85025 COMPLETE CBC W/AUTO DIFF WBC: CPT

## 2022-06-11 PROCEDURE — 36415 COLL VENOUS BLD VENIPUNCTURE: CPT

## 2022-06-11 PROCEDURE — 95813 EEG EXTND MNTR 61-119 MIN: CPT

## 2022-06-11 PROCEDURE — 83605 ASSAY OF LACTIC ACID: CPT

## 2022-06-11 PROCEDURE — 6370000000 HC RX 637 (ALT 250 FOR IP): Performed by: NURSE PRACTITIONER

## 2022-06-11 PROCEDURE — 99232 SBSQ HOSP IP/OBS MODERATE 35: CPT | Performed by: NURSE PRACTITIONER

## 2022-06-11 RX ORDER — LEVETIRACETAM 500 MG/1
500 TABLET ORAL DAILY
Qty: 30 TABLET | Refills: 1 | Status: SHIPPED | OUTPATIENT
Start: 2022-06-11 | End: 2022-08-14

## 2022-06-11 RX ORDER — POTASSIUM CHLORIDE 20 MEQ/1
20 TABLET, EXTENDED RELEASE ORAL DAILY
Qty: 30 TABLET | Refills: 5 | Status: SHIPPED | OUTPATIENT
Start: 2022-06-11

## 2022-06-11 RX ORDER — LEVETIRACETAM 500 MG/1
500 TABLET ORAL 2 TIMES DAILY
Status: DISCONTINUED | OUTPATIENT
Start: 2022-06-11 | End: 2022-06-11 | Stop reason: HOSPADM

## 2022-06-11 RX ADMIN — ESCITALOPRAM OXALATE 10 MG: 10 TABLET ORAL at 08:52

## 2022-06-11 RX ADMIN — LEVETIRACETAM 500 MG: 500 TABLET, FILM COATED ORAL at 11:06

## 2022-06-11 ASSESSMENT — PAIN SCALES - GENERAL
PAINLEVEL_OUTOF10: 0

## 2022-06-11 NOTE — PROCEDURES
Continuous EEG monitoring record    Patient name: Angelina Mendoza    START:  06/10/2022 @ 08:00am   END: 06/11/2022 @ 12:37pm       Electroencephalographer: Tone Zhong MD PhD      CLINICAL DETAILS:  This EEG was performed on this 24 y. o.yo female admitted for Altered mental Status and concer for subclinical seizures      TECHNICAL DETAILS:  Continuous video-EEG monitoring was performed with 27 surface scalp electrodes placed according to the International 10-20 electrode placement system, using a 32-channel [employer] headbox. All EEG and video information was acquired digitally, including the use of automated spike and seizure detection software to detect epileptiform activity. An event button was also available to be depressed during clinical events. 06/11/2022 00:00am to 12:37pm   SEIZURES:  None  INTERICTAL:  None  PUSHBUTTONS:  No further pushbuttons overnight. BACKGROUND:  Excessive beta frequencies during waking, which consits of mixed posterior dominant alpha and beta frequencies. Symmetric drowsiness and sleep are seen  EKG:  regular      06/10/2022 08:00am  To 23:59pm   SEIZURES:  None  INTERICTAL:  None  PUSHBUTTONS:  10:18am and 10:27am and 19:37pm  for shaking in the bed  On EEG there were no underlying discharges or seizures present. Waking background was preserved during these clinical evens. BACKGROUND:  Excessive beta frequencies during waking, which consits of mixed posterior dominant alpha and beta frequencies. Symmetric drowsiness and sleep are seen  EKG:  regular      CLINICAL INTERPRETATION:  This EEG performed during the waking, drowsy, and sleep state is within the broad limits of normal for age and state. No epileptiform discharges or seizures were seen . There were several events of shaking as described above without EEG correlate. These events were not epileptic in nature. Clinical correlation is advised.         Tone Zhong MD PhD

## 2022-06-11 NOTE — SIGNIFICANT EVENT
MD called to bedside to evaluate for seizure-like activity. Upon arrival the patient was having active convulsions, thrashing in bed. Patient did not lose consciousness. No tongue-biting or urinary incontinence. Attempted to contact [facility] without response. 2mg of Ativan had already been administered. Another 2mg was ordered to be given, then another 2mg to make 6 mg total without resolution of seizure-like activity. Patient frightened and crying, complaining of blurry vision. I had witnessed the same activity yesterday morning while rounding on this patient on the ICU service. 2gm Keppra was ordered and loaded with resolution of seizure-like activity. Ordered 1gm Keppra BID starting tomorrow AM. CK, LA, CBC, RFP, UA ordered. Will follow-up on results. Update: no epileptic activity was noted on cvEEG, therefore Keppra BID starting tomorrow was discontinued. LA returned 8.7; CK 80; CBC wnl. K 3.2, repleted. Update #2: family at bedside had mentioned on several occasions that there are two relatives in the family with similar episodes who were diagnosed with a familial syndrome they were referring to as \"EoE, or EE\". 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 \"1 It does strike me as peculiar that surrounding the patient's attacks, or at least afterward, she does appear to have weakness, which the patient confirms on questioning. Potassium during her attacks has been consistently low. Variants of this type of hypokalemic PP respond well to potassium-sparing diuretics or carbonic anhydrase inhibitors.      Signed,    Aldair Chau MD  PGY-1    [email or web address]

## 2022-06-11 NOTE — PROGRESS NOTES
Pt discharged home with adilene. RN read and explained discharge instructions and pt verbalized understanding of dicharge instructions.  All questions answered and pt discharged home
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
## 2022-06-13 ENCOUNTER — HOSPITAL ENCOUNTER (OUTPATIENT)
Dept: GENERAL RADIOLOGY | Age: 22
Discharge: HOME | End: 2022-06-13
Payer: COMMERCIAL

## 2022-06-13 VITALS
RESPIRATION RATE: 14 BRPM | HEART RATE: 71 BPM | TEMPERATURE: 98.3 F | SYSTOLIC BLOOD PRESSURE: 118 MMHG | DIASTOLIC BLOOD PRESSURE: 76 MMHG | OXYGEN SATURATION: 98 %

## 2022-06-13 DIAGNOSIS — R56.9 SEIZURES (HCC): ICD-10-CM

## 2022-06-13 DIAGNOSIS — R93.89 ABNORMAL MRI: ICD-10-CM

## 2022-06-13 LAB
APPEARANCE CSF: CLEAR
CLOT EVALUATION CSF: ABNORMAL
COLOR CSF: COLORLESS
GLUCOSE, CSF: 54 MG/DL (ref 40–80)
NO DIFFERENTIAL CSF: ABNORMAL
RBC CSF: 1 /CUMM
TUBE NUMBER CSF: ABNORMAL
WBC CSF: 0 /CUMM (ref 0–5)

## 2022-06-13 PROCEDURE — 36415 COLL VENOUS BLD VENIPUNCTURE: CPT

## 2022-06-13 PROCEDURE — 2500000003 HC RX 250 WO HCPCS: Performed by: NURSE PRACTITIONER

## 2022-06-13 PROCEDURE — 82040 ASSAY OF SERUM ALBUMIN: CPT

## 2022-06-13 PROCEDURE — 7100000010 HC PHASE II RECOVERY - FIRST 15 MIN

## 2022-06-13 PROCEDURE — 89050 BODY FLUID CELL COUNT: CPT

## 2022-06-13 PROCEDURE — 62328 DX LMBR SPI PNXR W/FLUOR/CT: CPT

## 2022-06-13 PROCEDURE — 7100000011 HC PHASE II RECOVERY - ADDTL 15 MIN

## 2022-06-13 PROCEDURE — 86255 FLUORESCENT ANTIBODY SCREEN: CPT

## 2022-06-13 PROCEDURE — 86592 SYPHILIS TEST NON-TREP QUAL: CPT

## 2022-06-13 PROCEDURE — 83916 OLIGOCLONAL BANDS: CPT

## 2022-06-13 PROCEDURE — 82042 OTHER SOURCE ALBUMIN QUAN EA: CPT

## 2022-06-13 PROCEDURE — 84157 ASSAY OF PROTEIN OTHER: CPT

## 2022-06-13 PROCEDURE — 88112 CYTOPATH CELL ENHANCE TECH: CPT

## 2022-06-13 PROCEDURE — 82945 GLUCOSE OTHER FLUID: CPT

## 2022-06-13 PROCEDURE — 82784 ASSAY IGA/IGD/IGG/IGM EACH: CPT

## 2022-06-13 PROCEDURE — 86341 ISLET CELL ANTIBODY: CPT

## 2022-06-13 RX ORDER — LIDOCAINE HYDROCHLORIDE 10 MG/ML
5 INJECTION, SOLUTION INFILTRATION; PERINEURAL ONCE
Status: DISCONTINUED | OUTPATIENT
Start: 2022-06-13 | End: 2022-06-13 | Stop reason: CLARIF

## 2022-06-13 RX ADMIN — LIDOCAINE HYDROCHLORIDE 5 ML: 10 INJECTION, SOLUTION INFILTRATION; PERINEURAL at 12:27

## 2022-06-13 ASSESSMENT — PAIN SCALES - GENERAL
PAINLEVEL_OUTOF10: 5
PAINLEVEL_OUTOF10: 0

## 2022-06-13 ASSESSMENT — PAIN - FUNCTIONAL ASSESSMENT: PAIN_FUNCTIONAL_ASSESSMENT: 0-10

## 2022-06-13 NOTE — PROGRESS NOTES
1230 pt resting quietly and comfortably, denies pain. Back band aid clean dry and intact, no redness or swelling noted. 1300 d/c instructions given to pt and boyfriend. No change in status. Pt resting quietly. 1315 pt d/c'd to home per boyfriend, no problems noted, to car per w/c, pt denies headache. Ambulatory Surgery/Procedure Discharge Note    Vitals:    06/13/22 1300   BP: 118/76   Pulse: 71   Resp: 14   Temp: 98.3 °F (36.8 °C)   SpO2: 98%       No intake/output data recorded. Restroom use offered before discharge. Yes    Pain assessment:  none  Pain Level: 0        Patient discharged to home/self care.  Patient discharged via wheel chair by transporter to waiting family/S.O.       6/13/2022 1:52 PM

## 2022-06-15 ENCOUNTER — FOLLOWUP TELEPHONE ENCOUNTER (OUTPATIENT)
Dept: ICU | Age: 22
End: 2022-06-15

## 2022-06-15 LAB
ALBUMIN CSF: 13.2 MG/DL (ref 10–30)
ALBUMIN SERPL-MCNC: 4203 MG/DL (ref 3400–5000)
IGG CSF: 0.8 MG/DL (ref 0–6)
IGG INDEX: 0.42 (ref 0.2–0.7)
IGG SYNTHESIS RATE CSF: -2.7 MG/DAY (ref -9.9–3.3)
IGG: 609 MG/DL (ref 700–1600)
PROTEIN CSF: 23 MG/DL (ref 15–45)

## 2022-06-16 LAB
CSF INTERPRETATION: NORMAL
OLIGOCLONAL BANDS CSF: 0
OLIGOCLONAL BANDS: 0

## 2022-06-17 LAB — VDRL CSF SCREEN: NON REACTIVE

## 2022-08-14 ENCOUNTER — HOSPITAL ENCOUNTER (INPATIENT)
Age: 22
LOS: 1 days | Discharge: LEFT BEFORE DISCHARGE | DRG: 053 | End: 2022-08-15
Attending: EMERGENCY MEDICINE | Admitting: INTERNAL MEDICINE
Payer: COMMERCIAL

## 2022-08-14 ENCOUNTER — APPOINTMENT (OUTPATIENT)
Dept: GENERAL RADIOLOGY | Age: 22
DRG: 053 | End: 2022-08-14
Payer: COMMERCIAL

## 2022-08-14 DIAGNOSIS — R10.13 EPIGASTRIC PAIN: Primary | ICD-10-CM

## 2022-08-14 PROBLEM — R56.9 SEIZURE (HCC): Status: ACTIVE | Noted: 2022-08-14

## 2022-08-14 LAB
ALBUMIN SERPL-MCNC: 4.9 G/DL (ref 3.4–5)
ALP BLD-CCNC: 77 U/L (ref 40–129)
ALT SERPL-CCNC: 14 U/L (ref 10–40)
ANION GAP SERPL CALCULATED.3IONS-SCNC: 17 MMOL/L (ref 3–16)
ANION GAP SERPL CALCULATED.3IONS-SCNC: 27 MMOL/L (ref 3–16)
AST SERPL-CCNC: 21 U/L (ref 15–37)
BACTERIA: ABNORMAL /HPF
BASOPHILS ABSOLUTE: 0.1 K/UL (ref 0–0.2)
BASOPHILS RELATIVE PERCENT: 0.5 %
BILIRUB SERPL-MCNC: 1.1 MG/DL (ref 0–1)
BILIRUBIN DIRECT: <0.2 MG/DL (ref 0–0.3)
BILIRUBIN URINE: NEGATIVE
BILIRUBIN, INDIRECT: ABNORMAL MG/DL (ref 0–1)
BLOOD, URINE: ABNORMAL
BUN BLDV-MCNC: 7 MG/DL (ref 7–20)
BUN BLDV-MCNC: 8 MG/DL (ref 7–20)
CALCIUM SERPL-MCNC: 9.5 MG/DL (ref 8.3–10.6)
CALCIUM SERPL-MCNC: 9.7 MG/DL (ref 8.3–10.6)
CHLORIDE BLD-SCNC: 101 MMOL/L (ref 99–110)
CHLORIDE BLD-SCNC: 104 MMOL/L (ref 99–110)
CLARITY: CLEAR
CO2: 12 MMOL/L (ref 21–32)
CO2: 19 MMOL/L (ref 21–32)
COLOR: YELLOW
CREAT SERPL-MCNC: 0.6 MG/DL (ref 0.6–1.1)
CREAT SERPL-MCNC: 0.8 MG/DL (ref 0.6–1.1)
EOSINOPHILS ABSOLUTE: 0 K/UL (ref 0–0.6)
EOSINOPHILS RELATIVE PERCENT: 0 %
EPITHELIAL CELLS, UA: ABNORMAL /HPF (ref 0–5)
GFR AFRICAN AMERICAN: >60
GFR AFRICAN AMERICAN: >60
GFR NON-AFRICAN AMERICAN: >60
GFR NON-AFRICAN AMERICAN: >60
GLUCOSE BLD-MCNC: 90 MG/DL (ref 70–99)
GLUCOSE BLD-MCNC: 91 MG/DL (ref 70–99)
GLUCOSE URINE: NEGATIVE MG/DL
HCG(URINE) PREGNANCY TEST: NEGATIVE
HCT VFR BLD CALC: 46.7 % (ref 36–48)
HEMOGLOBIN: 15.4 G/DL (ref 12–16)
KETONES, URINE: >=80 MG/DL
LACTIC ACID: 2.8 MMOL/L (ref 0.4–2)
LEUKOCYTE ESTERASE, URINE: NEGATIVE
LIPASE: 17 U/L (ref 13–60)
LYMPHOCYTES ABSOLUTE: 2.1 K/UL (ref 1–5.1)
LYMPHOCYTES RELATIVE PERCENT: 14.7 %
MAGNESIUM: 1.7 MG/DL (ref 1.8–2.4)
MCH RBC QN AUTO: 31 PG (ref 26–34)
MCHC RBC AUTO-ENTMCNC: 33 G/DL (ref 31–36)
MCV RBC AUTO: 93.9 FL (ref 80–100)
MICROSCOPIC EXAMINATION: YES
MONOCYTES ABSOLUTE: 1.3 K/UL (ref 0–1.3)
MONOCYTES RELATIVE PERCENT: 9.4 %
MUCUS: ABNORMAL /LPF
NEUTROPHILS ABSOLUTE: 10.7 K/UL (ref 1.7–7.7)
NEUTROPHILS RELATIVE PERCENT: 75.4 %
NITRITE, URINE: NEGATIVE
PDW BLD-RTO: 13.8 % (ref 12.4–15.4)
PH UA: 6 (ref 5–8)
PLATELET # BLD: 306 K/UL (ref 135–450)
PMV BLD AUTO: 9.4 FL (ref 5–10.5)
POTASSIUM SERPL-SCNC: 3 MMOL/L (ref 3.5–5.1)
POTASSIUM SERPL-SCNC: 3.4 MMOL/L (ref 3.5–5.1)
PROTEIN UA: 30 MG/DL
RBC # BLD: 4.98 M/UL (ref 4–5.2)
RBC UA: ABNORMAL /HPF (ref 0–4)
SODIUM BLD-SCNC: 140 MMOL/L (ref 136–145)
SODIUM BLD-SCNC: 140 MMOL/L (ref 136–145)
SPECIFIC GRAVITY UA: >=1.03 (ref 1–1.03)
TOTAL CK: 464 U/L (ref 26–192)
TOTAL PROTEIN: 7.4 G/DL (ref 6.4–8.2)
URINE TYPE: ABNORMAL
UROBILINOGEN, URINE: 0.2 E.U./DL
WBC # BLD: 14.2 K/UL (ref 4–11)
WBC UA: ABNORMAL /HPF (ref 0–5)

## 2022-08-14 PROCEDURE — 83605 ASSAY OF LACTIC ACID: CPT

## 2022-08-14 PROCEDURE — 99285 EMERGENCY DEPT VISIT HI MDM: CPT

## 2022-08-14 PROCEDURE — 2580000003 HC RX 258: Performed by: STUDENT IN AN ORGANIZED HEALTH CARE EDUCATION/TRAINING PROGRAM

## 2022-08-14 PROCEDURE — 81001 URINALYSIS AUTO W/SCOPE: CPT

## 2022-08-14 PROCEDURE — 82550 ASSAY OF CK (CPK): CPT

## 2022-08-14 PROCEDURE — 6360000002 HC RX W HCPCS: Performed by: STUDENT IN AN ORGANIZED HEALTH CARE EDUCATION/TRAINING PROGRAM

## 2022-08-14 PROCEDURE — 96365 THER/PROPH/DIAG IV INF INIT: CPT

## 2022-08-14 PROCEDURE — 83735 ASSAY OF MAGNESIUM: CPT

## 2022-08-14 PROCEDURE — 71045 X-RAY EXAM CHEST 1 VIEW: CPT

## 2022-08-14 PROCEDURE — 1200000000 HC SEMI PRIVATE

## 2022-08-14 PROCEDURE — 36415 COLL VENOUS BLD VENIPUNCTURE: CPT

## 2022-08-14 PROCEDURE — 96375 TX/PRO/DX INJ NEW DRUG ADDON: CPT

## 2022-08-14 PROCEDURE — 6370000000 HC RX 637 (ALT 250 FOR IP): Performed by: STUDENT IN AN ORGANIZED HEALTH CARE EDUCATION/TRAINING PROGRAM

## 2022-08-14 PROCEDURE — 83690 ASSAY OF LIPASE: CPT

## 2022-08-14 PROCEDURE — G0378 HOSPITAL OBSERVATION PER HR: HCPCS

## 2022-08-14 PROCEDURE — 80076 HEPATIC FUNCTION PANEL: CPT

## 2022-08-14 PROCEDURE — 84703 CHORIONIC GONADOTROPIN ASSAY: CPT

## 2022-08-14 PROCEDURE — 80048 BASIC METABOLIC PNL TOTAL CA: CPT

## 2022-08-14 PROCEDURE — 96374 THER/PROPH/DIAG INJ IV PUSH: CPT

## 2022-08-14 PROCEDURE — 85025 COMPLETE CBC W/AUTO DIFF WBC: CPT

## 2022-08-14 RX ORDER — ONDANSETRON 2 MG/ML
4 INJECTION INTRAMUSCULAR; INTRAVENOUS EVERY 6 HOURS PRN
Status: DISCONTINUED | OUTPATIENT
Start: 2022-08-14 | End: 2022-08-15 | Stop reason: HOSPADM

## 2022-08-14 RX ORDER — SODIUM CHLORIDE, SODIUM LACTATE, POTASSIUM CHLORIDE, CALCIUM CHLORIDE 600; 310; 30; 20 MG/100ML; MG/100ML; MG/100ML; MG/100ML
1000 INJECTION, SOLUTION INTRAVENOUS CONTINUOUS
Status: DISCONTINUED | OUTPATIENT
Start: 2022-08-14 | End: 2022-08-14

## 2022-08-14 RX ORDER — POLYETHYLENE GLYCOL 3350 17 G/17G
17 POWDER, FOR SOLUTION ORAL DAILY PRN
Status: DISCONTINUED | OUTPATIENT
Start: 2022-08-14 | End: 2022-08-15 | Stop reason: HOSPADM

## 2022-08-14 RX ORDER — SODIUM CHLORIDE 0.9 % (FLUSH) 0.9 %
5-40 SYRINGE (ML) INJECTION EVERY 12 HOURS SCHEDULED
Status: DISCONTINUED | OUTPATIENT
Start: 2022-08-14 | End: 2022-08-15 | Stop reason: HOSPADM

## 2022-08-14 RX ORDER — AMMONIA INHALANTS 0.04 G/.3ML
INHALANT RESPIRATORY (INHALATION)
Status: DISPENSED
Start: 2022-08-14 | End: 2022-08-15

## 2022-08-14 RX ORDER — MAGNESIUM SULFATE 1 G/100ML
1000 INJECTION INTRAVENOUS ONCE
Status: COMPLETED | OUTPATIENT
Start: 2022-08-14 | End: 2022-08-15

## 2022-08-14 RX ORDER — ESCITALOPRAM OXALATE 10 MG/1
10 TABLET ORAL DAILY
Status: DISCONTINUED | OUTPATIENT
Start: 2022-08-15 | End: 2022-08-15 | Stop reason: HOSPADM

## 2022-08-14 RX ORDER — SODIUM CHLORIDE 9 MG/ML
INJECTION, SOLUTION INTRAVENOUS PRN
Status: DISCONTINUED | OUTPATIENT
Start: 2022-08-14 | End: 2022-08-15 | Stop reason: HOSPADM

## 2022-08-14 RX ORDER — ACETAMINOPHEN 650 MG/1
650 SUPPOSITORY RECTAL EVERY 6 HOURS PRN
Status: DISCONTINUED | OUTPATIENT
Start: 2022-08-14 | End: 2022-08-15 | Stop reason: HOSPADM

## 2022-08-14 RX ORDER — SODIUM CHLORIDE 9 MG/ML
INJECTION, SOLUTION INTRAVENOUS CONTINUOUS
Status: DISCONTINUED | OUTPATIENT
Start: 2022-08-14 | End: 2022-08-15 | Stop reason: HOSPADM

## 2022-08-14 RX ORDER — LANOLIN ALCOHOL/MO/W.PET/CERES
400 CREAM (GRAM) TOPICAL DAILY
Status: DISCONTINUED | OUTPATIENT
Start: 2022-08-14 | End: 2022-08-14

## 2022-08-14 RX ORDER — SODIUM CHLORIDE 0.9 % (FLUSH) 0.9 %
5-40 SYRINGE (ML) INJECTION PRN
Status: DISCONTINUED | OUTPATIENT
Start: 2022-08-14 | End: 2022-08-15 | Stop reason: HOSPADM

## 2022-08-14 RX ORDER — ONDANSETRON 2 MG/ML
8 INJECTION INTRAMUSCULAR; INTRAVENOUS ONCE
Status: COMPLETED | OUTPATIENT
Start: 2022-08-14 | End: 2022-08-14

## 2022-08-14 RX ORDER — POTASSIUM CHLORIDE 20 MEQ/1
40 TABLET, EXTENDED RELEASE ORAL ONCE
Status: COMPLETED | OUTPATIENT
Start: 2022-08-14 | End: 2022-08-14

## 2022-08-14 RX ORDER — ACETAMINOPHEN 325 MG/1
650 TABLET ORAL EVERY 6 HOURS PRN
Status: DISCONTINUED | OUTPATIENT
Start: 2022-08-14 | End: 2022-08-15 | Stop reason: HOSPADM

## 2022-08-14 RX ORDER — ATOGEPANT 10 MG/1
1 TABLET ORAL DAILY
COMMUNITY

## 2022-08-14 RX ORDER — POTASSIUM CHLORIDE 20 MEQ/1
40 TABLET, EXTENDED RELEASE ORAL ONCE
Status: COMPLETED | OUTPATIENT
Start: 2022-08-14 | End: 2022-08-15

## 2022-08-14 RX ORDER — ENOXAPARIN SODIUM 100 MG/ML
40 INJECTION SUBCUTANEOUS DAILY
Status: DISCONTINUED | OUTPATIENT
Start: 2022-08-15 | End: 2022-08-15 | Stop reason: HOSPADM

## 2022-08-14 RX ORDER — ONDANSETRON 4 MG/1
4 TABLET, ORALLY DISINTEGRATING ORAL EVERY 8 HOURS PRN
Status: DISCONTINUED | OUTPATIENT
Start: 2022-08-14 | End: 2022-08-15 | Stop reason: HOSPADM

## 2022-08-14 RX ADMIN — SODIUM CHLORIDE 500 MG: 9 INJECTION, SOLUTION INTRAVENOUS at 23:29

## 2022-08-14 RX ADMIN — ONDANSETRON 8 MG: 2 INJECTION INTRAMUSCULAR; INTRAVENOUS at 18:13

## 2022-08-14 RX ADMIN — POTASSIUM CHLORIDE 40 MEQ: 1500 TABLET, EXTENDED RELEASE ORAL at 19:34

## 2022-08-14 RX ADMIN — Medication 400 MG: at 19:34

## 2022-08-14 RX ADMIN — SODIUM CHLORIDE, POTASSIUM CHLORIDE, SODIUM LACTATE AND CALCIUM CHLORIDE 1000 ML: 600; 310; 30; 20 INJECTION, SOLUTION INTRAVENOUS at 18:19

## 2022-08-14 RX ADMIN — SODIUM CHLORIDE: 9 INJECTION, SOLUTION INTRAVENOUS at 23:27

## 2022-08-14 ASSESSMENT — PAIN - FUNCTIONAL ASSESSMENT
PAIN_FUNCTIONAL_ASSESSMENT: NONE - DENIES PAIN
PAIN_FUNCTIONAL_ASSESSMENT: NONE - DENIES PAIN

## 2022-08-14 ASSESSMENT — PAIN SCALES - GENERAL: PAINLEVEL_OUTOF10: 10

## 2022-08-14 NOTE — Clinical Note
Discharge Plan[de-identified] Other/[facility])   Telemetry/Cardiac Monitoring Required?: Yes   Bed request comments: 5T

## 2022-08-14 NOTE — ED PROVIDER NOTES
[address] [facility]          EM RESIDENT NOTE       Date of evaluation: 8/14/2022    Chief Complaint     Shaking and Abdominal Pain      of Present Illness     Sarah Basilio is a 24 y.o. female with history of (?) seizure on keppra, PNES, ADHD, anxiety, depression, GERD, IBS, recent lap marjorie (~2 months ago per patient) who presents to the emergency department for vomiting and abdominal pain. Patient reports that over the past week she has been experiencing epigastric abdominal pain as well as a few episodes per day of emesis which she describes as green color. She cannot identify anything that exacerbates or alleviates the pain. She also feels that she has had more seizures and is concerned that her pain could be causing this. She reports compliance with her medication regimen. She denies any other symptoms such as dysuria, hematuria, vaginal discharge, lower abdominal pain, diarrhea, constipation, blood in her stool, chest pain, shortness of breath, fever, or chills. She does report history of similar seizure frequency with prior episodes of abdominal pain. Overall, she feels that her abdominal pain was better after cholecystectomy however worsened over the past week. She was seen at [facility] 2 days ago for the symptoms and was discharged. She then went to [facility] in [place] where she states that they wanted to do EEG and further work-up but she left [address] because she was dissatisfied with her care. Review of Systems     Positive for abdominal pain, vomiting. Negative for fever, chills, chest pain, shortness of breath, diarrhea, pedal edema. All other symptoms as mentioned previously in the HPI. Past Medical, Surgical, Family, and Social History     She has a past medical history of Hypokalemia and Kidney stone.   She has a past surgical history that includes other surgical history (12/23/2021) and Upper gastrointestinal endoscopy (N/A, 12/23/2021). Her family history is not on file. She reports that she has been smoking cigarettes. She has been smoking an average of .5 packs per day. She has never used smokeless tobacco. She reports current drug use. Frequency: 7.00 times per week. Drug: Marijuana Sandor Coil). She reports that she does not drink alcohol. Medications     Previous Medications    ESCITALOPRAM (LEXAPRO) 10 MG TABLET    Take 10 mg by mouth daily    LEVETIRACETAM (KEPPRA) 500 MG TABLET    Take 1 tablet by mouth daily    POTASSIUM CHLORIDE (KLOR-CON M) 20 MEQ EXTENDED RELEASE TABLET    Take 1 tablet by mouth daily       Allergies     She is allergic to zoloft [sertraline]. Physical Exam     INITIAL VITALS: BP: 112/70, Temp: 98.5 °F (36.9 °C), Heart Rate: (!) 108, Resp: 24, SpO2: 98 %     Physical exam  General: well-appearing, no acute distress  HEENT: NC/AT, no discharge from the eyes or nose. OP clear. MMM. Cardiovascular: regular rate and rhythm, no murmurs. Strong pulses in all 4 extremities. Pulmonary: non-labored breathing, normal lung sounds. Speaks in full sentences. Abdominal: soft,. +ttp in the epigastrium and right upper quadrant. Well-healed laparoscopic incisions. Non-distended. No rebound or rigidity. Musculoskeletal: no long bone deformity  Extremities: no peripheral edema  Skin: dry, no rashes or lesions. No lacerations or abrasions.   Neuro: alert and oriented, 5/5 strength in all extremities, SILT, speech and mentation normal, no focal deficits, gait narrow and stable, no tremor    DiagnosticResults       RADIOLOGY:  No orders to display       LABS:   Results for orders placed or performed during the hospital encounter of 08/14/22   CBC with Auto Differential   Result Value Ref Range    WBC 14.2 (H) 4.0 - 11.0 K/uL    RBC 4.98 4.00 - 5.20 M/uL    Hemoglobin 15.4 12.0 - 16.0 g/dL    Hematocrit 46.7 36.0 - 48.0 %    MCV 93.9 80.0 - 100.0 fL    MCH 31.0 26.0 - 34.0 pg    [facility] colleague, Dr. Cheryl Fortune. All questions were answered appropriately. Patient verbalized understanding and agreement with the above treatment plan. This patient was also evaluated by the attending physician. All care plans were discussed and agreed upon. Clinical Impression     1. Epigastric pain        Disposition     PATIENT REFERRED TO:  No follow-up provider specified.     DISCHARGE MEDICATIONS:  New Prescriptions    No medications on file       DISPOSITION Decision To Admit 08/14/2022 07:01:56 PM       Sixto Mcclain MD  Resident  08/14/22 [id number]

## 2022-08-14 NOTE — ED PROVIDER NOTES
[address] [facility] RESIDENT NOTE     Date of evaluation: 8/14/2022    ADDENDUM:      Care of this patient was assumed from Dr. Светлана Ortiz. The patient was seen for Shaking and Abdominal Pain  . The patient's initial evaluation and plan have been discussed with the prior provider who initially evaluated the patient. Nursing Notes, Past Medical Hx, Past Surgical Hx, Social Hx, Allergies, and Family Hx were all reviewed.     Diagnostic Results     EKG   NA    RADIOLOGY:  No orders to display       LABS:   Results for orders placed or performed during the hospital encounter of 08/14/22   CBC with Auto Differential   Result Value Ref Range    WBC 14.2 (H) 4.0 - 11.0 K/uL    RBC 4.98 4.00 - 5.20 M/uL    Hemoglobin 15.4 12.0 - 16.0 g/dL    Hematocrit 46.7 36.0 - 48.0 %    MCV 93.9 80.0 - 100.0 fL    MCH 31.0 26.0 - 34.0 pg    MCHC 33.0 31.0 - 36.0 g/dL    RDW 13.8 12.4 - 15.4 %    Platelets 841 278 - 883 K/uL    MPV 9.4 5.0 - 10.5 fL    Neutrophils % 75.4 %    Lymphocytes % 14.7 %    Monocytes % 9.4 %    Eosinophils % 0.0 %    Basophils % 0.5 %    Neutrophils Absolute 10.7 (H) 1.7 - 7.7 K/uL    Lymphocytes Absolute 2.1 1.0 - 5.1 K/uL    Monocytes Absolute 1.3 0.0 - 1.3 K/uL    Eosinophils Absolute 0.0 0.0 - 0.6 K/uL    Basophils Absolute 0.1 0.0 - 0.2 K/uL   BMP   Result Value Ref Range    Sodium 140 136 - 145 mmol/L    Potassium 3.0 (L) 3.5 - 5.1 mmol/L    Chloride 101 99 - 110 mmol/L    CO2 12 (LL) 21 - 32 mmol/L    Anion Gap 27 (H) 3 - 16    Glucose 91 70 - 99 mg/dL    BUN 7 7 - 20 mg/dL    Creatinine 0.8 0.6 - 1.1 mg/dL    GFR Non-African American >60 >60    GFR African American >60 >60    Calcium 9.7 8.3 - 10.6 mg/dL   Urinalysis   Result Value Ref Range    Color, UA Yellow Straw/Yellow    Clarity, UA Clear Clear    Glucose, Ur Negative Negative mg/dL    Bilirubin Urine Negative Negative    Ketones, Urine >=80 (A) Negative mg/dL    Specific Gravity, UA >=1.030 1.005 - 1.030 Blood, Urine MODERATE (A) Negative    pH, UA 6.0 5.0 - 8.0    Protein, UA 30 (A) Negative mg/dL    Urobilinogen, Urine 0.2 <2.0 E.U./dL    Nitrite, Urine Negative Negative    Leukocyte Esterase, Urine Negative Negative    Microscopic Examination YES     Urine Type Other    Urine Preg (Lab)   Result Value Ref Range    HCG(Urine) Pregnancy Test Negative Detects HCG level >20 MIU/mL   Magnesium   Result Value Ref Range    Magnesium 1.70 (L) 1.80 - 2.40 mg/dL   Hepatic Function Panel   Result Value Ref Range    Total Protein 7.4 6.4 - 8.2 g/dL    Albumin 4.9 3.4 - 5.0 g/dL    Alkaline Phosphatase 77 40 - 129 U/L    ALT 14 10 - 40 U/L    AST 21 15 - 37 U/L    Total Bilirubin 1.1 (H) 0.0 - 1.0 mg/dL    Bilirubin, Direct <0.2 0.0 - 0.3 mg/dL    Bilirubin, Indirect see below 0.0 - 1.0 mg/dL   Lipase   Result Value Ref Range    Lipase 17.0 13.0 - 60.0 U/L   Microscopic Urinalysis   Result Value Ref Range    Mucus, UA 2+ (A) None Seen /LPF    WBC, UA 3-5 0 - 5 /HPF    RBC, UA 3-4 0 - 4 /HPF    Epithelial Cells, UA 2-5 0 - 5 /HPF    Bacteria, UA 4+ (A) None Seen /HPF       RECENT VITALS:  BP: 114/65, Temp: 98.5 °F (36.9 °C), Heart Rate: (!) 108, Resp: 24, SpO2: 97 %     Procedures     NA    ED Course     The patient was given the following medications:  Orders Placed This Encounter   Medications    lactated ringers infusion 1,000 mL    ondansetron (ZOFRAN) injection 8 mg    ammonia inhaler     Daniel Scott: cabinet override    potassium chloride (KLOR-CON M) extended release tablet 40 mEq    magnesium oxide (MAG-OX) tablet 400 mg       CONSULTS:  None    MEDICAL Violetaice Seed / ASSESSMENT / Jade Juliana is a 24 y.o. female with a past medical history of seizures and PNES presenting with increased seizure frequency and abdominal pain. My examination patient states her abdominal pain is gone and improved with Zofran.      In terms of laboratory work-up initially had an anion gap of 27 which improved with a liter of fluids along with a leukocytosis and slightly elevated lactate. Believe this could be secondary to potential seizures. Low suspicion for infectious etiology. Given Keppra at outside hospital.  Abdominal pain subsided in the emergency department. Given location and nature and history of cannabinoid use, discussed with patient if she has had recent use and she said yes and this usually precipitates this type of pain, low suspicion for acute intra-abdominal pathology given normal LFTs, lipase and recently remove gallbladder. Given increased seizure frequency will admit to medicine for further neurologic work-up. This patient was also evaluated by the attending physician. All care plans were discussed and agreed upon. Clinical Impression     1. Epigastric pain        Disposition     PATIENT REFERRED TO:  No follow-up provider specified.     DISCHARGE MEDICATIONS:  New Prescriptions    No medications on file       DISPOSITION Decision To Admit 08/14/2022 07:01:56 PM             Luis Vizcaino MD  Resident  08/14/22 2040

## 2022-08-14 NOTE — ED PROVIDER NOTES
ED Attending Attestation Note     Date of evaluation: 8/14/2022    This patient was seen by the resident. I have seen and examined the patient, agree with the workup, evaluation, management and diagnosis. The care plan has been discussed. I have reviewed the ECG and concur with the resident's interpretation. My assessment reveals Jamie Tamayo with nausea upper abdominal pain and possible collapse. She has a history of's \"spells\". She has had her gallbladder taken out for this abdominal pain but it keeps recurring she is awake alert abdomen soft with mild upper abdominal tenderness no rebound or peritoneal signs.        Yimi Clarke MD  08/14/22 [id number]

## 2022-08-15 ENCOUNTER — APPOINTMENT (OUTPATIENT)
Dept: CT IMAGING | Age: 22
DRG: 053 | End: 2022-08-15
Payer: COMMERCIAL

## 2022-08-15 VITALS
DIASTOLIC BLOOD PRESSURE: 71 MMHG | HEART RATE: 74 BPM | SYSTOLIC BLOOD PRESSURE: 118 MMHG | WEIGHT: 113 LBS | BODY MASS INDEX: 20.8 KG/M2 | OXYGEN SATURATION: 97 % | HEIGHT: 62 IN | RESPIRATION RATE: 16 BRPM | TEMPERATURE: 98.5 F

## 2022-08-15 LAB
AMPHETAMINE SCREEN, URINE: ABNORMAL
ANION GAP SERPL CALCULATED.3IONS-SCNC: 16 MMOL/L (ref 3–16)
BARBITURATE SCREEN URINE: ABNORMAL
BASOPHILS ABSOLUTE: 0 K/UL (ref 0–0.2)
BASOPHILS RELATIVE PERCENT: 0.6 %
BENZODIAZEPINE SCREEN, URINE: POSITIVE
BUN BLDV-MCNC: 9 MG/DL (ref 7–20)
CALCIUM SERPL-MCNC: 8.8 MG/DL (ref 8.3–10.6)
CANNABINOID SCREEN URINE: POSITIVE
CHLORIDE BLD-SCNC: 106 MMOL/L (ref 99–110)
CO2: 20 MMOL/L (ref 21–32)
COCAINE METABOLITE SCREEN URINE: ABNORMAL
CREAT SERPL-MCNC: 0.6 MG/DL (ref 0.6–1.1)
EOSINOPHILS ABSOLUTE: 0 K/UL (ref 0–0.6)
EOSINOPHILS RELATIVE PERCENT: 0.3 %
GFR AFRICAN AMERICAN: >60
GFR NON-AFRICAN AMERICAN: >60
GLUCOSE BLD-MCNC: 64 MG/DL (ref 70–99)
HCT VFR BLD CALC: 39.6 % (ref 36–48)
HEMOGLOBIN: 13.6 G/DL (ref 12–16)
LACTIC ACID: 0.9 MMOL/L (ref 0.4–2)
LYMPHOCYTES ABSOLUTE: 2.2 K/UL (ref 1–5.1)
LYMPHOCYTES RELATIVE PERCENT: 33.5 %
Lab: ABNORMAL
MAGNESIUM: 1.9 MG/DL (ref 1.8–2.4)
MCH RBC QN AUTO: 31.5 PG (ref 26–34)
MCHC RBC AUTO-ENTMCNC: 34.4 G/DL (ref 31–36)
MCV RBC AUTO: 91.7 FL (ref 80–100)
METHADONE SCREEN, URINE: ABNORMAL
MONOCYTES ABSOLUTE: 0.6 K/UL (ref 0–1.3)
MONOCYTES RELATIVE PERCENT: 9.5 %
NEUTROPHILS ABSOLUTE: 3.6 K/UL (ref 1.7–7.7)
NEUTROPHILS RELATIVE PERCENT: 56.1 %
OPIATE SCREEN URINE: ABNORMAL
OXYCODONE URINE: ABNORMAL
PDW BLD-RTO: 13.5 % (ref 12.4–15.4)
PH UA: 4
PHENCYCLIDINE SCREEN URINE: ABNORMAL
PLATELET # BLD: 198 K/UL (ref 135–450)
PMV BLD AUTO: 8.7 FL (ref 5–10.5)
POTASSIUM REFLEX MAGNESIUM: 3.9 MMOL/L (ref 3.5–5.1)
PROPOXYPHENE SCREEN: ABNORMAL
RBC # BLD: 4.32 M/UL (ref 4–5.2)
SODIUM BLD-SCNC: 142 MMOL/L (ref 136–145)
TOTAL CK: 235 U/L (ref 26–192)
TOTAL CK: 363 U/L (ref 26–192)
WBC # BLD: 6.4 K/UL (ref 4–11)

## 2022-08-15 PROCEDURE — 99254 IP/OBS CNSLTJ NEW/EST MOD 60: CPT

## 2022-08-15 PROCEDURE — 6370000000 HC RX 637 (ALT 250 FOR IP): Performed by: STUDENT IN AN ORGANIZED HEALTH CARE EDUCATION/TRAINING PROGRAM

## 2022-08-15 PROCEDURE — 80307 DRUG TEST PRSMV CHEM ANLYZR: CPT

## 2022-08-15 PROCEDURE — 82550 ASSAY OF CK (CPK): CPT

## 2022-08-15 PROCEDURE — 74177 CT ABD & PELVIS W/CONTRAST: CPT

## 2022-08-15 PROCEDURE — 6360000002 HC RX W HCPCS: Performed by: STUDENT IN AN ORGANIZED HEALTH CARE EDUCATION/TRAINING PROGRAM

## 2022-08-15 PROCEDURE — 80048 BASIC METABOLIC PNL TOTAL CA: CPT

## 2022-08-15 PROCEDURE — 83735 ASSAY OF MAGNESIUM: CPT

## 2022-08-15 PROCEDURE — 2580000003 HC RX 258: Performed by: STUDENT IN AN ORGANIZED HEALTH CARE EDUCATION/TRAINING PROGRAM

## 2022-08-15 PROCEDURE — 87040 BLOOD CULTURE FOR BACTERIA: CPT

## 2022-08-15 PROCEDURE — 83605 ASSAY OF LACTIC ACID: CPT

## 2022-08-15 PROCEDURE — 85025 COMPLETE CBC W/AUTO DIFF WBC: CPT

## 2022-08-15 PROCEDURE — G0378 HOSPITAL OBSERVATION PER HR: HCPCS

## 2022-08-15 PROCEDURE — 96366 THER/PROPH/DIAG IV INF ADDON: CPT

## 2022-08-15 PROCEDURE — 6360000004 HC RX CONTRAST MEDICATION

## 2022-08-15 PROCEDURE — 36415 COLL VENOUS BLD VENIPUNCTURE: CPT

## 2022-08-15 RX ORDER — POTASSIUM CHLORIDE 20 MEQ/1
40 TABLET, EXTENDED RELEASE ORAL ONCE
Status: COMPLETED | OUTPATIENT
Start: 2022-08-15 | End: 2022-08-15

## 2022-08-15 RX ORDER — LORAZEPAM 2 MG/ML
INJECTION INTRAMUSCULAR
Status: DISCONTINUED
Start: 2022-08-15 | End: 2022-08-15 | Stop reason: WASHOUT

## 2022-08-15 RX ADMIN — SODIUM CHLORIDE 500 MG: 9 INJECTION, SOLUTION INTRAVENOUS at 10:43

## 2022-08-15 RX ADMIN — POTASSIUM CHLORIDE 40 MEQ: 1500 TABLET, EXTENDED RELEASE ORAL at 12:10

## 2022-08-15 RX ADMIN — MAGNESIUM SULFATE HEPTAHYDRATE 1000 MG: 1 INJECTION, SOLUTION INTRAVENOUS at 00:26

## 2022-08-15 RX ADMIN — ESCITALOPRAM OXALATE 10 MG: 10 TABLET ORAL at 12:10

## 2022-08-15 RX ADMIN — IOPAMIDOL 75 ML: 755 INJECTION, SOLUTION INTRAVENOUS at 12:35

## 2022-08-15 RX ADMIN — SODIUM CHLORIDE, PRESERVATIVE FREE 10 ML: 5 INJECTION INTRAVENOUS at 10:48

## 2022-08-15 RX ADMIN — SODIUM CHLORIDE, PRESERVATIVE FREE 10 ML: 5 INJECTION INTRAVENOUS at 00:27

## 2022-08-15 RX ADMIN — POTASSIUM CHLORIDE 40 MEQ: 1500 TABLET, EXTENDED RELEASE ORAL at 00:21

## 2022-08-15 ASSESSMENT — ENCOUNTER SYMPTOMS
NAUSEA: 1
VOMITING: 1

## 2022-08-15 NOTE — H&P
History and Physical  PGY-3    PCP: NIKA Andrews CNP    Code:Prior  Admit Date: 8/14/2022  Diet: No diet orders on file      History of present illness:      CC: Seizure    Patient is a 24 y.o. female with a PMHx of PNES, seizure presenting with chief complaint of seizure. Patient mentions that she has history of seizure. She is on Keppra for her seizure. Reports that she sometimes is not compliant with her medications because she continues to have seizures despite taking her medications. Reports that she had a seizure episode yesterday and her fiancé took her to [facility] in [place]. She was not satisfied with the hospital staff and left the hospital [address] Then, she decided to present to [facility] ED. Reports a few episode of non-bloody vomiting. Patient endorses marijuana use. She denies having dysuria, vaginal discharge, abdominal pain, constipation, diarrhea, chest pain, shortness of breath, fever, or chills. She denies having any URI symptoms or any sick contact. Mentions that she is very concerned about her seizure because she cannot do anything by her own. Mentions that she saw neurology and neurology wanted to change her antiseizure medication from [address] to something new. In the ED, temperature 98.5 °F, respiratory rate 24, heart rate 108, blood pressure 112/70, SPO2 98% on room air. BMP significant for potassium 3, anion gap 27, magnesium 1.7, lactic acid 2.8, , WBC 14.2. UA was positive for ketones. Urine pregnancy test was negative and chest x-ray showed no acute cardiopulmonary finding. ROS: Review of Systems -   All other systems reviewed and are negative. A 10 point review of systems was conducted, significant findings as noted in HPI.   Past Medical / Surgical History:    Past Medical History:   Diagnosis Date    Hypokalemia     Kidney stone      Past Surgical History:   Procedure Laterality Date    OTHER SURGICAL HISTORY and Rhythm: Normal rate and regular rhythm. Pulmonary:      Effort: Pulmonary effort is normal.      Breath sounds: Normal breath sounds. Abdominal:      Palpations: Abdomen is soft. Musculoskeletal:         General: Normal range of motion. Cervical back: Normal range of motion and neck supple. Neurological:      General: No focal deficit present. Mental Status: She is alert and oriented to person, place, and time. Labs & Imaging:   LABS:  CBC:   Recent Labs     08/14/22 1722   WBC 14.2*   HGB 15.4   HCT 46.7      MCV 93.9                            Renal:   Recent Labs     08/14/22 1722 08/14/22  1942    140   K 3.0* 3.4*    104   CO2 12* 19*   BUN 7 8   CREATININE 0.8 0.6   GLUCOSE 91 90   ANIONGAP 27* 17*     Hepatic:   Recent Labs     08/14/22 1722   AST 21   ALT 14   BILITOT 1.1*   ALKPHOS 77     Troponin: No results for input(s): TROPONINI in the last 72 hours. BNP: No results for input(s): BNP in the last 72 hours. Lipids: No results for input(s): CHOL, HDL in the last 72 hours. Invalid input(s): LDLCALCU, TRIGLYCERIDE  INR: No results for input(s): INR in the last 72 hours. Lactate: No results for input(s): LACTATE in the last 72 hours. ABGs:No results for input(s): PHART, SLN0GNS, PO2ART, QUP5APA, BEART, THGBART, O2YWIXUO, GUA3UIS in the last 72 hours. UA:  Recent Labs     08/14/22 1722   COLORU Yellow   PHUR 6.0   WBCUA 3-5   RBCUA 3-4   MUCUS 2+*   BACTERIA 4+*   CLARITYU Clear   SPECGRAV >=1.030   LEUKOCYTESUR Negative   UROBILINOGEN 0.2   BILIRUBINUR Negative   BLOODU MODERATE*   GLUCOSEU Negative        IMAGING:  XR CHEST PORTABLE   Final Result      No acute cardiopulmonary findings. Assessment & Plan:    Mark Chacko is a 24 y.o. female with PMHx significant for PNES, seizure presenting with chief complaint of seizure. Seizure-like activity:  -Patient presented with chief complaint of seizure-like activity.   She has history of seizure and is on Keppra 500 twice daily. Patient mentions that she is not always compliant with her medications because medication does not prevent seizure. -In the ED, CK and lactic acid were elevated. -EEG  -Neuro check every 4 hour  -Keppra 500 twice daily  -Urine drug screen  -Neurology consult    Anion gap metabolic acidosis:  -Secondary to most likely lactic acidosis. -IV fluid    Lactic acidosis:  -IV fluid  -Monitor lactic acid    Hypokalemia and hypomagnesemia:  -Replete magnesium and potassium    Cannabis use:      Code Status: Prior  No diet orders on file  PPX: Lovenox       This patient will be discussed with attending, Mildred Goodman DO. Kamlesh Trammell MD MD, PGY- 3  Contact via [employer]  8/14/2022,  10:24 PM     I saw the patient independently from the resident . I discussed the care with the resident. I personally reviewed the HPI, PH, FH, SH, ROS and medications. I repeated pertinent portions of the examination and reviewed the relevant imaging and laboratory data. I agree with the findings, assessment and plan as documented.  addition to: Plan as above

## 2022-08-15 NOTE — PLAN OF CARE
Problem: Safety - Adult  Goal: Free from fall injury  Outcome: Progressing   Pt free from injury this shift and free of falls. 2/4 rails up on bed and bed is in the lowest position. Wheels locked and bed alarm set. Socks on pt and ID bands on pt. Call light in reach of pt and pt educated to call out to get up. Will continue to monitor for safety. Avasys on for seizure precaution  Problem: Neurosensory - Adult  Goal: Absence of seizures  Outcome: Progressing   NONE NOTED.

## 2022-08-15 NOTE — PLAN OF CARE
Problem: ABCDS Injury Assessment  Goal: Absence of physical injury  Outcome: Completed     Problem: Neurosensory - Adult  Goal: Achieves stable or improved neurological status  Outcome: Completed  Flowsheets (Taken 8/15/2022 0700)  Achieves stable or improved neurological status: Assess for and report changes in neurological status     Problem: Neurosensory - Adult  Goal: Absence of seizures  8/15/2022 1447 by Puja Preston RN  Outcome: Completed  Flowsheets (Taken 8/15/2022 0700)  Absence of seizures:   Monitor for seizure activity.   If seizure occurs, document type and location of movements and any associated apnea   If seizure occurs, turn head to side and suction secretions as needed     Problem: Neurosensory - Adult  Goal: Remains free of injury related to seizures activity  Outcome: Completed  Flowsheets (Taken 8/15/2022 0700)  Remains free of injury related to seizure activity:   Maintain airway, patient safety  and administer oxygen as ordered   Monitor patient for seizure activity, document and report duration and description of seizure to Licensed Independent Practitioner   If seizure occurs, turn patient to side and suction secretions as needed   Reorient patient post seizure   Seizure pads on all 4 side rails     Problem: Neurosensory - Adult  Goal: Achieves maximal functionality and self care  Outcome: Completed  Flowsheets (Taken 8/15/2022 0700)  Achieves maximal functionality and self care: Monitor swallowing and airway patency with patient fatigue and changes in neurological status     Problem: Discharge Planning  Goal: Discharge to home or other facility with appropriate resources  Outcome: Completed     Problem: Safety - Adult  Goal: Free from fall injury  8/15/2022 1447 by Puja Preston RN  Outcome: Completed     Problem: Pain  Goal: Verbalizes/displays adequate comfort level or baseline comfort level  Outcome: Completed

## 2022-08-15 NOTE — CARE COORDINATION
Case Management Assessment           Initial Evaluation                Date / Time of Evaluation: 8/15/2022 1:06 PM                 Assessment Completed by: SIMON Caldera    Patient Name: Estrellita Richey     YOB: 2000  Diagnosis: Seizure [facility]) [R56.9]  Epigastric pain [R10.13]     Date / Time: 8/14/2022  3:59 PM    Patient Admission Status: Inpatient    If patient is discharged prior to next notation, then this note serves as note for discharge by case management. Current PCP: Wojciech [facility] Patient: No    Chart Reviewed: Yes  Patient/ Family Interviewed: Yes    Initial assessment completed at bedside with: Patient    Hospitalization in the last 30 days: No    Emergency Contacts:  No emergency contact information on file. Advance Directives:   Code Status: Full Code    Healthcare Power of : No    Financial  Payor: [employer] / Plan: Rosie Valderrama / Product Type: *No Product type* /     Pre-cert required for SNF: No    Pharmacy    [address] [place] [address] Dr MACEDO [phone] [place] [phone]  [address] [facility] [address] [address]  Phone: [phone] Fax: [phone]      Potential assistance Purchasing Medications: Potential Assistance Purchasing Medications: No  Does Patient want to participate in local refill/ meds to beds program?:      Meds To Beds General Rules:  1. Can ONLY be done Monday- Friday between 8:30am-5pm  2. Prescription(s) must be in pharmacy by 3pm to be filled same day  3. Copy of patient's insurance/ prescription drug card and patient face sheet must be sent along with the prescription(s)  4. Cost of Rx cannot be added to hospital bill. If financial assistance is needed, please contact unit  or ;  or  CANNOT provide pharmacy voucher for patients co-pays  5.  Patients can then  the prescription on their way out of the hospital at discharge, or pharmacy can deliver to the bedside if staff is available. (payment due at time of pick-up or delivery - cash, check, or card accepted)     Able to afford home medications/ co-pay costs: Yes    ADLS  Support Systems: Spouse/Significant Other, Friends/Neighbors    PT AM-PAC:   /24  OT AM-PAC:   /24    New Иван: home with fiance  Steps: n/a    Plans to RETURN to current housing: Yes  Barriers to RETURNING to current housing: none identified    Additional Case Management Notes: SW met with pt at bedside. SW observed pt to appear depressed, was very emotional while talking with this SW. Pt reports she has been dx with depression and takes Lexapro, prescribed by her PCP. Pt also reports there are a lot of \"triggers\" here at the hospital that is causing her to want to leave. SW utilized reflective listening skills with pt. Pt could benefit from a psych consult. UPDATE: Per chart review, pt left AMA    The Plan for Transition of Care is related to the following treatment goals of Seizure [facility]) [R56.9]  Epigastric pain [R10.13]    The Patient and/or patient representative Sarah and her family were provided with a choice of provider and agrees with the discharge plan Yes    Freedom of choice list was provided with basic dialogue that supports the patient's individualized plan of care/goals and shares the quality data associated with the providers.  Not Indicated    Care Transition patient: No    SIMON James  [facility] [employer]  Case Management Department  Ph: [phone]   Fax: [phone]

## 2022-08-15 NOTE — DISCHARGE SUMMARY
INTERNAL MEDICINE DEPARTMENT  DISCHARGE SUMMARY    Patient [place]: [id number]                                             Discharge Date: 8/15/2022   Patient's PCP: Gladys Montano, APRN - CNP                                          Discharge Physician: Dheeraj Arevalo DPM MD  Admit Date: 8/14/2022   Admitting Physician: Sixto Moreno DO    DISCHARGE DIAGNOSES:  Present on Admission:   Seizure ([address]          Heena Serna is a 24 y.o. female with history of (?) seizure on keppra, PNES, ADHD, anxiety, depression, GERD, IBS, recent lap marjorie (~2 months ago per patient) who presents to the emergency department for vomiting and abdominal pain. Patient reports that over the past week she has been experiencing epigastric abdominal pain as well as a few episodes per day of emesis which she describes as green color. She cannot identify anything that exacerbates or alleviates the pain. She also feels that she has had more seizures and is concerned that her pain could be causing this. She reports compliance with her medication regimen. She denies any other symptoms such as dysuria, hematuria, vaginal discharge, lower abdominal pain, diarrhea, constipation, blood in her stool, chest pain, shortness of breath, fever, or chills. She does report history of similar seizure frequency with prior episodes of abdominal pain. Overall, she feels that her abdominal pain was better after cholecystectomy however worsened over the past week. She was seen at [facility] 2 days ago for the symptoms and was discharged. She then went to [facility] in [place] where she states that they wanted to do EEG and further work-up but she left [address] because she was dissatisfied with her care.       The following issues were addressed during hospitalization: abdominal pain and emesis, seizures       Physical Exam:  /71   Pulse 74   Temp 98.5 °F (36.9 °C) (Oral)   Resp 16   Ht 5' 2\"
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
## 2022-08-15 NOTE — PROGRESS NOTES
Patient taken down to CT via wheelchair. Experienced seizure like activity on the way back from CT. Patient assisted back to 5 tower with RN and two PCAs. Patient returned to room 5515. Denies further needs at this time.

## 2022-08-15 NOTE — ED NOTES
Attempt to call report to receiving RN on unit. Pt currently not assigned to a RN at this time. Requests to call back in 5 minutes to give report.       Jeannette Calero RN  08/14/22 2896

## 2022-08-15 NOTE — PROGRESS NOTES
Progress Note    Admit Date: 8/14/2022  Diet: ADULT DIET; Regular    CC: seizure-like activity, epigastric pain with emesis this morning. Nursing noted seizure like event with the patient around 10:50 am via perfect serve. Interval history: NAEON. Patient relates continued nausea. Medications:     Scheduled Meds:   potassium chloride  40 mEq Oral Once    escitalopram  10 mg Oral Daily    sodium chloride flush  5-40 mL IntraVENous 2 times per day    enoxaparin  40 mg SubCUTAneous Daily    levETIRAcetam  500 mg IntraVENous Q12H     Continuous Infusions:   sodium chloride      sodium chloride 100 mL/hr at 08/14/22 2327     PRN Meds:sodium chloride flush, sodium chloride, ondansetron **OR** ondansetron, polyethylene glycol, acetaminophen **OR** acetaminophen    Objective:   Vitals:   T-max:  Patient Vitals for the past 8 hrs:   BP Temp Temp src Pulse Resp SpO2   08/15/22 0604 (!) 101/55 99.1 °F (37.3 °C) Oral 63 16 94 %   08/15/22 0350 114/62 98.9 °F (37.2 °C) Oral 82 16 97 %       Intake/Output Summary (Last 24 hours) at 8/15/2022 0913  Last data filed at 8/15/2022 0026  Gross per 24 hour   Intake 60 ml   Output --   Net 60 ml     Review of Systems   Constitutional:  Positive for fatigue. Cardiovascular: Negative. Gastrointestinal:  Positive for nausea and vomiting. Musculoskeletal:  Positive for myalgias. Skin: Negative. Neurological:  Positive for tremors and seizures. Psychiatric/Behavioral:  Positive for agitation. The patient is nervous/anxious. Physical Exam  HENT:      Head: Normocephalic. Nose: Nose normal.   Eyes:      Pupils: Pupils are equal, round, and reactive to light. Cardiovascular:      Rate and Rhythm: Normal rate. Pulses: Normal pulses. Pulmonary:      Effort: Pulmonary effort is normal.   Abdominal:      Tenderness: There is abdominal tenderness. There is right CVA tenderness and guarding. Musculoskeletal:         General: Normal range of motion. Cervical back: Normal range of motion. Skin:     General: Skin is warm. Neurological:      General: No focal deficit present. Mental Status: She is alert. LABS:    CBC:   Recent Labs     08/14/22  1722   WBC 14.2*   HGB 15.4   HCT 46.7      MCV 93.9     Renal:    Recent Labs     08/14/22  1722 08/14/22  1942    140   K 3.0* 3.4*    104   CO2 12* 19*   BUN 7 8   CREATININE 0.8 0.6   GLUCOSE 91 90   CALCIUM 9.7 9.5   MG 1.70*  --    ANIONGAP 27* 17*     Hepatic:   Recent Labs     08/14/22  1722   AST 21   ALT 14   BILITOT 1.1*   BILIDIR <0.2   PROT 7.4   LABALBU 4.9   ALKPHOS 77     Troponin:   No results for input(s): TROPONINI in the last 72 hours. BNP: No results for input(s): BNP in the last 72 hours. Lipids:   No results for input(s): CHOL, HDL in the last 72 hours. Invalid input(s): LDLCALCU, TRIGLYCERIDE  ABGs:  No results for input(s): PHART, OLQ1KHX, PO2ART, AGF6HLW, BEART, THGBART, Q8ZPIVWV, QVE9VCP in the last 72 hours. INR:   No results for input(s): INR in the last 72 hours. Lactate: No results for input(s): LACTATE in the last 72 hours. Cultures:  -----------------------------------------------------------------  RAD:   XR CHEST PORTABLE   Final Result      No acute cardiopulmonary findings. Assessment/Plan:   Juwan Woody is a 24 y.o. female with PMHx significant for PNES, seizure presenting with chief complaint of seizure. Seizure-like activity:  -Patient presented with chief complaint of seizure-like activity. She has history of seizure and is on Keppra 500 twice daily. Patient mentions that she is not always compliant with her medications because medication does not prevent seizure. -In the ED, CK and lactic acid were elevated.   -Patient would benefit from continuous eeg this hospitalization.   -Neuro check every 4 hour  -Keppra 500 twice daily  -Urine drug screen  -Neurology consult,     Anion gap metabolic acidosis:  -Secondary to most likely lactic acidosis.   -IV fluid     Lactic acidosis:  -IV fluid  -Monitor lactic acid     Hypokalemia and hypomagnesemia:  -Replete magnesium and potassium     Cannabis use:   possible cannabis hyperemesis syndrome  -ondansetron prn     Code Status: Prior  FEN: IV fluids, Adult diet  PPX: Kia Arevalo DPM, PGY-1  08/15/22  9:13 AM    This patient has will be staffed and discussed with Dr. Yessica Kruse MD.

## 2022-08-15 NOTE — PROGRESS NOTES
4 Eyes Admission Assessment     I agree as the admission nurse that 2 RN's have performed a thorough Head to Toe Skin Assessment on the patient. ALL assessment sites listed below have been assessed on admission. Areas assessed by both nurses:   [x]   Head, Face, and Ears   [x]   Shoulders, Back, and Chest  [x]   Arms, Elbows, and Hands   [x]   Coccyx, Sacrum, and Ischium  [x]   Legs, Feet, and Heels        Does the Patient have Skin Breakdown?   No       Scattered bruising  Anup Prevention initiated:  Yes   Wound Care Orders initiated:  NA      [facility] nurse consulted for Pressure Injury (Stage 3,4, Unstageable, DTI, NWPT, and Complex wounds) or Anup score 18 or lower:  NA      Nurse 1 eSignature: Electronically signed by Rachana Simms RN on 8/15/22 at 12:31 AM EDT    **SHARE this note so that the co-signing nurse is able to place an eSignature**    Nurse 2 eSignature: {Esignature:[id number]}

## 2022-08-15 NOTE — PROGRESS NOTES
Pt states she arrived to [facility] at 2218. This writer became aware of pt arrival at [facility] [address] when she was found in room by this writer. Unit clerk and this writer had not been notified of pt arrival. Pt found down on floor on her bottom. Pt did not hit her head and stated she had slipped oob trying to reach for garbage can. MD aware ans saw pt bedside. Admitted to 5515. IVF running per orders. Will check vitals and release orders. Pt endorses abd pain and was shaking but alert and talking. Abd pain and shaking resolved once pt took some deep breaths and was back to bed    As of now, Bed alarm set and avasys is on. Call light in reach. Will continue to monitor.

## 2022-08-16 ENCOUNTER — FOLLOWUP TELEPHONE ENCOUNTER (OUTPATIENT)
Dept: ICU | Age: 22
End: 2022-08-16

## 2022-08-19 LAB
BLOOD CULTURE, ROUTINE: NORMAL
CULTURE, BLOOD 2: NORMAL

## (undated) DEVICE — YANKAUER,BULB TIP,W/O VENT,RIGID,STERILE: Brand: MEDLINE

## (undated) DEVICE — ENDOSCOPIC KIT 2 12 FT OP4 DE2 GWN SYR

## (undated) DEVICE — CONMED SCOPE SAVER BITE BLOCK, 20X27 MM: Brand: SCOPE SAVER

## (undated) DEVICE — FORCEP BX STD CAP 240CM RAD JAW 4

## (undated) DEVICE — CANNULA,OXY,ADULT,SUPERSOFT,W/7'TUB,SC: Brand: MEDLINE INDUSTRIES, INC.